# Patient Record
Sex: FEMALE | Race: WHITE | NOT HISPANIC OR LATINO | Employment: PART TIME | URBAN - METROPOLITAN AREA
[De-identification: names, ages, dates, MRNs, and addresses within clinical notes are randomized per-mention and may not be internally consistent; named-entity substitution may affect disease eponyms.]

---

## 2017-07-13 ENCOUNTER — TRANSCRIBE ORDERS (OUTPATIENT)
Dept: ADMINISTRATIVE | Facility: HOSPITAL | Age: 62
End: 2017-07-13

## 2017-07-13 DIAGNOSIS — Z12.31 VISIT FOR SCREENING MAMMOGRAM: Primary | ICD-10-CM

## 2017-08-29 ENCOUNTER — HOSPITAL ENCOUNTER (OUTPATIENT)
Dept: MAMMOGRAPHY | Facility: MEDICAL CENTER | Age: 62
Discharge: HOME | End: 2017-08-29
Payer: COMMERCIAL

## 2017-08-29 DIAGNOSIS — Z12.31 VISIT FOR SCREENING MAMMOGRAM: ICD-10-CM

## 2017-08-29 PROCEDURE — G0202 SCR MAMMO BI INCL CAD: HCPCS

## 2017-08-29 PROCEDURE — 77063 BREAST TOMOSYNTHESIS BI: CPT

## 2018-05-10 RX ORDER — METOPROLOL SUCCINATE 25 MG
37.5 TABLET, EXTENDED RELEASE 24 HR ORAL
Refills: 3 | COMMUNITY
Start: 2018-04-03

## 2018-05-15 ENCOUNTER — OFFICE VISIT (OUTPATIENT)
Dept: UROLOGY | Facility: MEDICAL CENTER | Age: 63
End: 2018-05-15
Payer: COMMERCIAL

## 2018-05-15 VITALS
BODY MASS INDEX: 23.21 KG/M2 | DIASTOLIC BLOOD PRESSURE: 80 MMHG | SYSTOLIC BLOOD PRESSURE: 120 MMHG | HEIGHT: 63 IN | WEIGHT: 131 LBS

## 2018-05-15 DIAGNOSIS — N39.0 URINARY TRACT INFECTION WITHOUT HEMATURIA, SITE UNSPECIFIED: Primary | ICD-10-CM

## 2018-05-15 DIAGNOSIS — N30.20 CHRONIC CYSTITIS: ICD-10-CM

## 2018-05-15 LAB
SL AMB  POCT GLUCOSE, UA: ABNORMAL
SL AMB LEUKOCYTE ESTERASE,UA: ABNORMAL
SL AMB POCT BILIRUBIN,UA: ABNORMAL
SL AMB POCT BLOOD,UA: ABNORMAL
SL AMB POCT CLARITY,UA: CLEAR
SL AMB POCT COLOR,UA: YELLOW
SL AMB POCT KETONES,UA: ABNORMAL
SL AMB POCT NITRITE,UA: ABNORMAL
SL AMB POCT PH,UA: 5.5
SL AMB POCT SPECIFIC GRAVITY,UA: 1.01
SL AMB POCT URINE PROTEIN: ABNORMAL
SL AMB POCT UROBILINOGEN: 0.2

## 2018-05-15 PROCEDURE — 99213 OFFICE O/P EST LOW 20 MIN: CPT | Performed by: UROLOGY

## 2018-05-15 PROCEDURE — 81003 URINALYSIS AUTO W/O SCOPE: CPT | Performed by: UROLOGY

## 2018-05-15 RX ORDER — FLUTICASONE PROPIONATE 50 MCG
2 SPRAY, SUSPENSION (ML) NASAL AS NEEDED
COMMUNITY
Start: 2017-12-05 | End: 2020-08-25

## 2018-05-15 RX ORDER — OMEPRAZOLE 10 MG/1
10 CAPSULE, DELAYED RELEASE ORAL AS NEEDED
COMMUNITY

## 2018-05-15 RX ORDER — TRIMETHOPRIM 100 MG/1
100 TABLET ORAL 2 TIMES DAILY
Qty: 60 TABLET | Refills: 3 | Status: SHIPPED | OUTPATIENT
Start: 2018-05-15 | End: 2018-07-14

## 2018-05-15 NOTE — PROGRESS NOTES
[address] [facility]  [facility]  [address] [facility] [address]  [place], [address] [facility]  [phone]  www  [facility]  org      NAME: Ronnie Garcia  AGE: 63 y o  SEX: female  : 1955   MRN: [id number]    DATE: 5/15/2018  TIME: 1:28 PM    Assessment and Plan:  Continue with TMP postcoitally  Will send in rx  See me in 2 years or p r n  Maddie Lightning Chief Complaint   No chief complaint on file  History of Present Illness   F/U of chronic cystitis- has been followed previously by Florian Primrose  Last urinary tract infection was over a year ago  Symptoms of an infection are typically urgency and pressure in the bladder , and she occasionally passes blood  Most of these are post-coital She takes TMP after intercourse   PSH: Cystoscopy -         [address] Notes: wisdom Teeth Removal -    Neuroma removed from foot -         NON- PSH: Aspirate/inj Ganglion Cyst, Right -   Tonsillectomy          PMH: Urinary tract infection, site not specified (Urinary Tract Infection) - 8/15/2016  Urinary Tract Infection - 2016, - 2015, - , - , - , - , - , -   Urethritis - , -   Frequency of Urination  Overactive Bladder  Urgency Of Urination  Urinary Retention       NON- PMH: Asymptomatic Varicose Veins  GERD  Hypertension  Mitral Valve Prolapse  Tmj Disorder Unspec       Immunizations: None     FAMILY HISTORY: Diabetes - Mother  Hypertension - Father  Kidney Stones - Daughter  Lung Cancer - Father     SOCIAL HISTORY: Marital Status:   Patient does not smoke anymore  Has not smoked since 1979  Smoked for 2 years  Does not use smokeless tobacco   Drinks 1 drink per month  Does not use drugs  Does not drink caffeine  Has not had a blood transfusion  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         The following portions of the patient's history were reviewed and updated as appropriate: allergies, current medications, past family history, past medical history, past social history, past surgical history and problem list     Review of Systems   Review of Systems   Genitourinary: Negative  Active Problem List   There is no problem list on file for this patient  Objective   There were no vitals taken for this visit  Physical Exam   Constitutional: She is oriented to person, place, and time  She appears well-developed and well-nourished  HENT:   Head: Normocephalic and atraumatic  Eyes: EOM are normal    Neck: Normal range of motion  Pulmonary/Chest: Effort normal    Musculoskeletal: Normal range of motion  Neurological: She is alert and oriented to person, place, and time  Skin: Skin is warm and dry  Psychiatric: She has a normal mood and affect  Her behavior is normal  Judgment and thought content normal            Current Medications     Current Outpatient Prescriptions:     TOPROL XL 25 MG 24 hr tablet, TAKE 0 5 TABLETS (12 5 MG TOTAL) BY MOUTH DAILY  , Disp: , Rfl: 3        Clemon Canavan, MD

## 2018-05-15 NOTE — LETTER
May 15, 2018     Jonathan Salazar, DO  [address] [facility] [address]    Patient: Alicia Bonilla   YOB: 1955   Date of Visit: 5/15/2018       Dear Dr Alistair Thomas: Thank you for referring Alicia Bonilla to me for evaluation  Below are my notes for this consultation  If you have questions, please do not hesitate to call me  I look forward to following your patient along with you  Sincerely,        Frankie Azar MD        CC: No Recipients  Frankie Azar MD  5/15/2018  1:46 PM  Sign at close encounter  [address] [facility]  [address], [address] [place] [id number]  www  [facility]  org      NAME: Alicia Bonilla  AGE: 63 y o  SEX: female  : 1955   MRN: [id number]    DATE: 5/15/2018  TIME: 1:28 PM    Assessment and Plan:  Continue with TMP postcoitally  Will send in rx  See me in 2 years or p r n  Ivett Vilchis Chief Complaint   No chief complaint on file  History of Present Illness   F/U of chronic cystitis- has been followed previously by Ken Angel  Last urinary tract infection was over a year ago  Symptoms of an infection are typically urgency and pressure in the bladder , and she occasionally passes blood  Most of these are post-coital She takes TMP after intercourse       PSH: Cystoscopy -         [address] [facility] [place] Notes: wisdom Teeth Removal -    Neuroma removed from foot -         NON- PSH: Aspirate/inj Ganglion Cyst, Right -   Tonsillectomy          PMH: Urinary tract infection, site not specified (Urinary Tract Infection) - 8/15/2016  Urinary Tract Infection - 2016, - 2015, - , - , - , - , - , -   Urethritis - , -   Frequency of Urination  Overactive Bladder  Urgency Of Urination  Urinary Retention       NON- PMH: Asymptomatic Varicose Veins  GERD  Hypertension  Mitral Valve Prolapse  Tmj Disorder Unspec Immunizations: None     FAMILY HISTORY: Diabetes - Mother  Hypertension - Father  Kidney Stones - Daughter  Lung Cancer - Father     SOCIAL HISTORY: Marital Status:   Patient does not smoke anymore  Has not smoked since 8/12/1979  Smoked for 2 years  Does not use smokeless tobacco   Drinks 1 drink per month  Does not use drugs  Does not drink caffeine  Has not had a blood transfusion  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  The following portions of the patient's history were reviewed and updated as appropriate: allergies, current medications, past family history, past medical history, past social history, past surgical history and problem list     Review of Systems   Review of Systems   Genitourinary: Negative  Active Problem List   There is no problem list on file for this patient  Objective   There were no vitals taken for this visit  Physical Exam   Constitutional: She is oriented to person, place, and time  She appears well-developed and well-nourished  HENT:   Head: Normocephalic and atraumatic  Eyes: EOM are normal    Neck: Normal range of motion  Pulmonary/Chest: Effort normal    Musculoskeletal: Normal range of motion  Neurological: She is alert and oriented to person, place, and time  Skin: Skin is warm and dry  Psychiatric: She has a normal mood and affect  Her behavior is normal  Judgment and thought content normal            Current Medications     Current Outpatient Prescriptions:     TOPROL XL 25 MG 24 hr tablet, TAKE 0 5 TABLETS (12 5 MG TOTAL) BY MOUTH DAILY  , Disp: , Rfl: 3        Kory Dudley MD

## 2018-08-14 ENCOUNTER — TRANSCRIBE ORDERS (OUTPATIENT)
Dept: ADMINISTRATIVE | Facility: HOSPITAL | Age: 63
End: 2018-08-14

## 2018-08-14 DIAGNOSIS — Z12.39 BREAST SCREENING: Primary | ICD-10-CM

## 2018-09-28 ENCOUNTER — HOSPITAL ENCOUNTER (OUTPATIENT)
Dept: MAMMOGRAPHY | Facility: MEDICAL CENTER | Age: 63
Discharge: HOME | End: 2018-09-28
Payer: COMMERCIAL

## 2018-09-28 DIAGNOSIS — Z12.39 BREAST SCREENING: ICD-10-CM

## 2018-09-28 PROCEDURE — 77063 BREAST TOMOSYNTHESIS BI: CPT

## 2018-09-28 PROCEDURE — 77067 SCR MAMMO BI INCL CAD: CPT

## 2018-11-12 ENCOUNTER — TELEPHONE (OUTPATIENT)
Dept: UROLOGY | Facility: AMBULATORY SURGERY CENTER | Age: 63
End: 2018-11-12

## 2018-11-12 ENCOUNTER — TELEPHONE (OUTPATIENT)
Dept: UROLOGY | Facility: MEDICAL CENTER | Age: 63
End: 2018-11-12

## 2018-11-12 ENCOUNTER — OFFICE VISIT (OUTPATIENT)
Dept: UROLOGY | Facility: MEDICAL CENTER | Age: 63
End: 2018-11-12
Payer: COMMERCIAL

## 2018-11-12 VITALS
HEIGHT: 63 IN | WEIGHT: 133 LBS | BODY MASS INDEX: 23.57 KG/M2 | DIASTOLIC BLOOD PRESSURE: 80 MMHG | SYSTOLIC BLOOD PRESSURE: 130 MMHG

## 2018-11-12 DIAGNOSIS — N39.0 URINARY TRACT INFECTION WITHOUT HEMATURIA, SITE UNSPECIFIED: Primary | ICD-10-CM

## 2018-11-12 LAB
SL AMB  POCT GLUCOSE, UA: ABNORMAL
SL AMB LEUKOCYTE ESTERASE,UA: ABNORMAL
SL AMB POCT BILIRUBIN,UA: ABNORMAL
SL AMB POCT BLOOD,UA: ABNORMAL
SL AMB POCT CLARITY,UA: CLEAR
SL AMB POCT COLOR,UA: ABNORMAL
SL AMB POCT KETONES,UA: ABNORMAL
SL AMB POCT NITRITE,UA: ABNORMAL
SL AMB POCT PH,UA: 5
SL AMB POCT SPECIFIC GRAVITY,UA: 1
SL AMB POCT URINE PROTEIN: ABNORMAL
SL AMB POCT UROBILINOGEN: 0.2

## 2018-11-12 PROCEDURE — 87186 SC STD MICRODIL/AGAR DIL: CPT | Performed by: UROLOGY

## 2018-11-12 PROCEDURE — 87077 CULTURE AEROBIC IDENTIFY: CPT | Performed by: UROLOGY

## 2018-11-12 PROCEDURE — 81003 URINALYSIS AUTO W/O SCOPE: CPT | Performed by: UROLOGY

## 2018-11-12 PROCEDURE — 99213 OFFICE O/P EST LOW 20 MIN: CPT | Performed by: UROLOGY

## 2018-11-12 PROCEDURE — 87086 URINE CULTURE/COLONY COUNT: CPT | Performed by: UROLOGY

## 2018-11-12 PROCEDURE — 87147 CULTURE TYPE IMMUNOLOGIC: CPT | Performed by: UROLOGY

## 2018-11-12 RX ORDER — CEPHALEXIN 500 MG/1
500 CAPSULE ORAL EVERY 6 HOURS SCHEDULED
Qty: 28 CAPSULE | Refills: 0 | Status: SHIPPED | OUTPATIENT
Start: 2018-11-12 | End: 2018-11-19

## 2018-11-12 NOTE — TELEPHONE ENCOUNTER
Pt having painful urination started 11/10/18 has hx uti asking if she can get urine checked before starting antibiotics she took 1 Trimethoprim 100 mg am /pm 11/11/18,shw was also diagnosed with herpes simplex and finished course Valacyclovir 11/11/18

## 2018-11-12 NOTE — LETTER
2018     Mandy Camera, DO  [address] [facility] [address]    Patient: Cameron Gupta   YOB: 1955   Date of Visit: 2018       Dear Dr Padmini Beard: Thank you for referring Cameron Gupta to me for evaluation  Below are my notes for this consultation  If you have questions, please do not hesitate to call me  I look forward to following your patient along with you  Sincerely,        Nati Mares MD        CC: No Recipients  Nati Mares MD  2018  2:08 PM  Sign at close encounter  [address] [facility]  [place] [phone]  www  [facility]  org      NAME: Cameron Gupta  AGE: 66 y o  SEX: female  : 1955   MRN: [id number]    DATE: 2018  TIME: 1:24 PM    Assessment and Plan:  Acute on chronic cystitis  It seems to be responding to the trimethoprim, but since she takes that long-term and she is still feeling a little rough, we will start on Keflex 500 mg p o  QID for 7 days  Follow-up as planned or sooner as needed  Chief Complaint   No chief complaint on file  History of Present Illness   History of chronic cystitis  She takes trimethoprim after intercourse  She started having painful urination on -is concerned that she might have an infection once again her urine checked  He was also 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  and was on valacyclovir, which he finished on Saturday evening  She started having signs of urinary tract infection on Saturday into 1 trimethoprim and started to feel better  The next day she developed cramping down her pelvic region and burning after urination in the groin area-the vaginal area felt very inflamed and swollen  She took 2 doses yesterday  She feels better in terms of urinary tract now  Urinalysis shows trace blood and trace leukocyte esterase  This was sent for culture  The following portions of the patient's history were reviewed and updated as appropriate: allergies, current medications, past family history, past medical history, past social history, past surgical history and problem list     Review of Systems   Review of Systems   Genitourinary: Positive for dysuria  Negative for difficulty urinating  Active Problem List   There is no problem list on file for this patient  Objective   There were no vitals taken for this visit  Physical Exam   Constitutional: She is oriented to person, place, and time  She appears well-developed and well-nourished  HENT:   Head: Normocephalic and atraumatic  Eyes: EOM are normal    Neck: Normal range of motion  Pulmonary/Chest: Effort normal    Musculoskeletal: Normal range of motion  Neurological: She is alert and oriented to person, place, and time  Skin: Skin is warm and dry  Psychiatric: She has a normal mood and affect  Her behavior is normal  Judgment and thought content normal            Current Medications     Current Outpatient Prescriptions:     ACIDOPHILUS LACTOBACILLUS PO, Take 1 tablet by mouth, Disp: , Rfl:     fluticasone (FLONASE) 50 mcg/act nasal spray, 2 sprays into each nostril, Disp: , Rfl:     Multiple Vitamins-Minerals (MULTIVITAMIN ADULT PO), Take 1 capsule by mouth, Disp: , Rfl:     Omega-3 Fatty Acids (FISH OIL PO), Take 1 g by mouth, Disp: , Rfl:     omeprazole (PriLOSEC) 10 mg delayed release capsule, Take 10 mg by mouth, Disp: , Rfl:     TOPROL XL 25 MG 24 hr tablet, TAKE 0 5 TABLETS (12 5 MG TOTAL) BY MOUTH DAILY  , Disp: , Rfl: 3        Faustino Wayne MD

## 2018-11-12 NOTE — TELEPHONE ENCOUNTER
Pt has taken 2 doses of Trimethoprim, has appt with PB today  Advised not to take any more, he will advise on further treatment when she sees him today  She agreed

## 2018-11-12 NOTE — TELEPHONE ENCOUNTER
Pt scheduled 2 pm w Dr Gloria Barclay 11/12, wanted to come in for appt; also wants to know if taking Trimethoprim is going to alter UC done today, please advise and call back    [phone]

## 2018-11-12 NOTE — PROGRESS NOTES
[address] [facility]  [facility]  [address] [facility] [place]  [place], [address] [place] [address]  [phone]  www  [facility]  org      NAME: Monique Bosch  AGE: 66 y o  SEX: female  : 1955   MRN: [id number]    DATE: 2018  TIME: 1:24 PM    Assessment and Plan:  Acute on chronic cystitis  It seems to be responding to the trimethoprim, but since she takes that long-term and she is still feeling a little rough, we will start on Keflex 500 mg p o  QID for 7 days  Follow-up as planned or sooner as needed  Chief Complaint   No chief complaint on file  History of Present Illness   History of chronic cystitis  She takes trimethoprim after intercourse  She started having painful urination on -is concerned that she might have an infection once again her urine checked  He was also 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  and was on valacyclovir, which he finished on Saturday evening  She started having signs of urinary tract infection on Saturday into 1 trimethoprim and started to feel better  The next day she developed cramping down her pelvic region and burning after urination in the groin area-the vaginal area felt very inflamed and swollen  She took 2 doses yesterday  She feels better in terms of urinary tract now  Urinalysis shows trace blood and trace leukocyte esterase  This was sent for culture  The following portions of the patient's history were reviewed and updated as appropriate: allergies, current medications, past family history, past medical history, past social history, past surgical history and problem list     Review of Systems   Review of Systems   Genitourinary: Positive for dysuria  Negative for difficulty urinating  Active Problem List   There is no problem list on file for this patient  Objective   There were no vitals taken for this visit      Physical Exam   Constitutional: She is oriented to person, place, and time  She appears well-developed and well-nourished  HENT:   Head: Normocephalic and atraumatic  Eyes: EOM are normal    Neck: Normal range of motion  Pulmonary/Chest: Effort normal    Musculoskeletal: Normal range of motion  Neurological: She is alert and oriented to person, place, and time  Skin: Skin is warm and dry  Psychiatric: She has a normal mood and affect  Her behavior is normal  Judgment and thought content normal            Current Medications     Current Outpatient Prescriptions:     ACIDOPHILUS LACTOBACILLUS PO, Take 1 tablet by mouth, Disp: , Rfl:     fluticasone (FLONASE) 50 mcg/act nasal spray, 2 sprays into each nostril, Disp: , Rfl:     Multiple Vitamins-Minerals (MULTIVITAMIN ADULT PO), Take 1 capsule by mouth, Disp: , Rfl:     Omega-3 Fatty Acids (FISH OIL PO), Take 1 g by mouth, Disp: , Rfl:     omeprazole (PriLOSEC) 10 mg delayed release capsule, Take 10 mg by mouth, Disp: , Rfl:     TOPROL XL 25 MG 24 hr tablet, TAKE 0 5 TABLETS (12 5 MG TOTAL) BY MOUTH DAILY  , Disp: , Rfl: 3        Khadar Larios MD

## 2018-11-12 NOTE — TELEPHONE ENCOUNTER
Patient called wanted to speak with the nurse to make sure the medication dosage was correct   She can be reached at [phone]

## 2018-11-12 NOTE — TELEPHONE ENCOUNTER
I spoke with the prescribing doctor, then with the patient; The dose prescribed by Dr Dajuan Loyola was in fact the intended dose  She has no further questions or concerns to be addressed at this time

## 2018-11-14 ENCOUNTER — TELEPHONE (OUTPATIENT)
Dept: UROLOGY | Facility: MEDICAL CENTER | Age: 63
End: 2018-11-14

## 2018-11-14 LAB
BACTERIA UR CULT: ABNORMAL
BACTERIA UR CULT: ABNORMAL

## 2018-11-14 NOTE — TELEPHONE ENCOUNTER
----- Message from Alesha Zeng MD sent at 11/14/2018  2:39 PM EST -----  She is already on Keflex  Please let patient know that she has an infection and she is on the appropriate antibiotic

## 2018-11-20 DIAGNOSIS — N30.20 CHRONIC CYSTITIS: Primary | ICD-10-CM

## 2018-11-20 NOTE — TELEPHONE ENCOUNTER
Patient c/o of burning lower abdomen almost every time she urinates  No fever,no blood,no nausea  She does feel like she empties her bladder also  She said her lower abdomen feels inflamed and her lower back just aches  She is not constipated  This all started while she was on the antibiotic  She was instructed to increase her fluids  Any orders

## 2018-11-20 NOTE — TELEPHONE ENCOUNTER
Called patient and scheduled her for tomorrow 11/21/18 at [facility]

## 2018-11-20 NOTE — TELEPHONE ENCOUNTER
Patient finished taking Keflex, but she is feeling residual burning after she urinates  She feels as if she's still inflamed  Please call her at [phone]

## 2018-11-20 NOTE — PROGRESS NOTES
Despite Keflex which she has the appropriate antibiotic  She still has pain with voiding, lower back pain  We will schedule renal ultrasound  She can also take Pyridium

## 2018-11-21 ENCOUNTER — HOSPITAL ENCOUNTER (OUTPATIENT)
Dept: ULTRASOUND IMAGING | Facility: MEDICAL CENTER | Age: 63
Discharge: HOME | End: 2018-11-21
Attending: UROLOGY
Payer: COMMERCIAL

## 2018-11-21 DIAGNOSIS — N30.20 CHRONIC CYSTITIS: ICD-10-CM

## 2018-11-21 PROCEDURE — 76770 US EXAM ABDO BACK WALL COMP: CPT

## 2019-02-21 ENCOUNTER — TELEPHONE (OUTPATIENT)
Dept: UROLOGY | Facility: AMBULATORY SURGERY CENTER | Age: 64
End: 2019-02-21

## 2019-02-21 DIAGNOSIS — N30.90 CYSTITIS: Primary | ICD-10-CM

## 2019-02-21 NOTE — TELEPHONE ENCOUNTER
Patient called thinks she has UTI and would like to have urinalysis done   She can be reached at [phone]

## 2019-02-21 NOTE — TELEPHONE ENCOUNTER
Left detailed message on  that urine culture order is in her  chart  She can give a sample at any  lab  Call back if any questions

## 2019-02-22 ENCOUNTER — APPOINTMENT (OUTPATIENT)
Dept: LAB | Facility: MEDICAL CENTER | Age: 64
End: 2019-02-22
Attending: UROLOGY
Payer: COMMERCIAL

## 2019-02-22 DIAGNOSIS — N30.90 CYSTITIS: ICD-10-CM

## 2019-02-22 PROCEDURE — 87086 URINE CULTURE/COLONY COUNT: CPT

## 2019-02-23 LAB — BACTERIA UR CULT: NORMAL

## 2019-02-25 ENCOUNTER — TELEPHONE (OUTPATIENT)
Dept: UROLOGY | Facility: MEDICAL CENTER | Age: 64
End: 2019-02-25

## 2019-02-25 NOTE — TELEPHONE ENCOUNTER
Patient is aware of culture  Patient was using Trimethoprim, culture is negative and can stop taking it  Patient asked if she should see her gyn for her pelvic pain vaginal dryness she has, suggested  they may help with her but it would be up to her

## 2019-04-05 ENCOUNTER — OFFICE VISIT (OUTPATIENT)
Dept: URGENT CARE | Facility: MEDICAL CENTER | Age: 64
End: 2019-04-05
Payer: COMMERCIAL

## 2019-04-05 VITALS
OXYGEN SATURATION: 99 % | WEIGHT: 133 LBS | BODY MASS INDEX: 23.57 KG/M2 | HEART RATE: 95 BPM | SYSTOLIC BLOOD PRESSURE: 200 MMHG | DIASTOLIC BLOOD PRESSURE: 80 MMHG | RESPIRATION RATE: 16 BRPM | TEMPERATURE: 96.6 F | HEIGHT: 63 IN

## 2019-04-05 DIAGNOSIS — R30.0 DYSURIA: ICD-10-CM

## 2019-04-05 DIAGNOSIS — N39.0 URINARY TRACT INFECTION WITH HEMATURIA, SITE UNSPECIFIED: Primary | ICD-10-CM

## 2019-04-05 DIAGNOSIS — R31.9 URINARY TRACT INFECTION WITH HEMATURIA, SITE UNSPECIFIED: Primary | ICD-10-CM

## 2019-04-05 LAB
SL AMB  POCT GLUCOSE, UA: NEGATIVE
SL AMB LEUKOCYTE ESTERASE,UA: ABNORMAL
SL AMB POCT BILIRUBIN,UA: NEGATIVE
SL AMB POCT BLOOD,UA: ABNORMAL
SL AMB POCT CLARITY,UA: CLEAR
SL AMB POCT COLOR,UA: ABNORMAL
SL AMB POCT KETONES,UA: NEGATIVE
SL AMB POCT NITRITE,UA: NEGATIVE
SL AMB POCT PH,UA: 6
SL AMB POCT SPECIFIC GRAVITY,UA: 1
SL AMB POCT URINE PROTEIN: NEGATIVE
SL AMB POCT UROBILINOGEN: 0.2

## 2019-04-05 PROCEDURE — 87077 CULTURE AEROBIC IDENTIFY: CPT | Performed by: FAMILY MEDICINE

## 2019-04-05 PROCEDURE — 87086 URINE CULTURE/COLONY COUNT: CPT | Performed by: FAMILY MEDICINE

## 2019-04-05 PROCEDURE — 99214 OFFICE O/P EST MOD 30 MIN: CPT | Performed by: FAMILY MEDICINE

## 2019-04-05 PROCEDURE — 87186 SC STD MICRODIL/AGAR DIL: CPT | Performed by: FAMILY MEDICINE

## 2019-04-05 RX ORDER — PHENAZOPYRIDINE HYDROCHLORIDE 100 MG/1
100 TABLET, FILM COATED ORAL 3 TIMES DAILY PRN
Qty: 10 TABLET | Refills: 0 | Status: SHIPPED | OUTPATIENT
Start: 2019-04-05 | End: 2019-08-07 | Stop reason: SDUPTHER

## 2019-04-05 RX ORDER — CEPHALEXIN 500 MG/1
500 CAPSULE ORAL EVERY 6 HOURS SCHEDULED
Qty: 28 CAPSULE | Refills: 0 | Status: SHIPPED | OUTPATIENT
Start: 2019-04-05 | End: 2019-04-12

## 2019-04-08 LAB — BACTERIA UR CULT: ABNORMAL

## 2019-04-09 ENCOUNTER — TELEPHONE (OUTPATIENT)
Dept: UROLOGY | Facility: MEDICAL CENTER | Age: 64
End: 2019-04-09

## 2019-04-09 DIAGNOSIS — R30.0 DYSURIA: Primary | ICD-10-CM

## 2019-04-15 ENCOUNTER — LAB (OUTPATIENT)
Dept: LAB | Facility: MEDICAL CENTER | Age: 64
End: 2019-04-15
Attending: UROLOGY
Payer: COMMERCIAL

## 2019-04-15 DIAGNOSIS — R30.0 DYSURIA: ICD-10-CM

## 2019-04-15 PROCEDURE — 87086 URINE CULTURE/COLONY COUNT: CPT

## 2019-04-16 LAB — BACTERIA UR CULT: NORMAL

## 2019-05-02 ENCOUNTER — TRANSCRIBE ORDERS (OUTPATIENT)
Dept: ADMINISTRATIVE | Facility: HOSPITAL | Age: 64
End: 2019-05-02

## 2019-05-02 DIAGNOSIS — Z12.39 BREAST SCREENING, UNSPECIFIED: Primary | ICD-10-CM

## 2019-08-05 ENCOUNTER — TELEPHONE (OUTPATIENT)
Dept: UROLOGY | Facility: MEDICAL CENTER | Age: 64
End: 2019-08-05

## 2019-08-05 ENCOUNTER — APPOINTMENT (OUTPATIENT)
Dept: LAB | Facility: MEDICAL CENTER | Age: 64
End: 2019-08-05
Attending: UROLOGY
Payer: COMMERCIAL

## 2019-08-05 ENCOUNTER — TELEPHONE (OUTPATIENT)
Dept: UROLOGY | Facility: AMBULATORY SURGERY CENTER | Age: 64
End: 2019-08-05

## 2019-08-05 DIAGNOSIS — N30.90 CYSTITIS: Primary | ICD-10-CM

## 2019-08-05 DIAGNOSIS — R30.0 DYSURIA: Primary | ICD-10-CM

## 2019-08-05 DIAGNOSIS — N30.20 CHRONIC CYSTITIS: Primary | ICD-10-CM

## 2019-08-05 DIAGNOSIS — N30.20 CHRONIC CYSTITIS: ICD-10-CM

## 2019-08-05 LAB
BACTERIA UR QL AUTO: ABNORMAL /HPF
BILIRUB UR QL STRIP: NEGATIVE
CLARITY UR: ABNORMAL
COLOR UR: YELLOW
GLUCOSE UR STRIP-MCNC: NEGATIVE MG/DL
HGB UR QL STRIP.AUTO: ABNORMAL
KETONES UR STRIP-MCNC: NEGATIVE MG/DL
LEUKOCYTE ESTERASE UR QL STRIP: ABNORMAL
NITRITE UR QL STRIP: NEGATIVE
NON-SQ EPI CELLS URNS QL MICRO: ABNORMAL /HPF
PH UR STRIP.AUTO: 6.5 [PH]
PROT UR STRIP-MCNC: ABNORMAL MG/DL
RBC #/AREA URNS AUTO: ABNORMAL /HPF
SP GR UR STRIP.AUTO: 1.01 (ref 1–1.03)
UROBILINOGEN UR QL STRIP.AUTO: 0.2 E.U./DL
WBC #/AREA URNS AUTO: ABNORMAL /HPF

## 2019-08-05 PROCEDURE — 81001 URINALYSIS AUTO W/SCOPE: CPT

## 2019-08-05 PROCEDURE — 87086 URINE CULTURE/COLONY COUNT: CPT

## 2019-08-05 RX ORDER — PHENAZOPYRIDINE HYDROCHLORIDE 200 MG/1
200 TABLET, FILM COATED ORAL 3 TIMES DAILY PRN
Qty: 30 TABLET | Refills: 1 | Status: SHIPPED | OUTPATIENT
Start: 2019-08-05 | End: 2019-08-07

## 2019-08-05 RX ORDER — OXYBUTYNIN CHLORIDE 5 MG/1
5 TABLET ORAL 3 TIMES DAILY PRN
Qty: 45 TABLET | Refills: 6 | Status: SHIPPED | OUTPATIENT
Start: 2019-08-05 | End: 2020-08-25 | Stop reason: ALTCHOICE

## 2019-08-05 NOTE — TELEPHONE ENCOUNTER
cary arzate while urinating and buring after   she would like to know if she should go have a culture done

## 2019-08-05 NOTE — TELEPHONE ENCOUNTER
Patient request ua order to be put in as well  Patient at lab now  Rigo Ray from office will put order in

## 2019-08-05 NOTE — TELEPHONE ENCOUNTER
Spoke to pt, c/o urgency, spasms, extreme pain and burning  Denies f/c, blood in urine  [facility]    Asking for rx to help with symptoms until results come back

## 2019-08-05 NOTE — TELEPHONE ENCOUNTER
Spoke with the patient; I've informed her that Dr Patricia Ellis has prescribed Pyridium 200mg for the burning sensation and Oxybutynin 5mg for her bladder spasms  She states that [facility] had called to tell her that her Pyridium 200mg wasn't covered by her insurance  However, she has a previous prescription for Pyridium 100mg that she got this past April  She'd like to know if she can use that instead  I've directed her that it's half the dose, so she'd need to take 2 tabs TID PRN  She understands  She'll wait for a call on her urine culture  She has no further questions or concerns to be addressed at this time

## 2019-08-05 NOTE — TELEPHONE ENCOUNTER
PT HAVING BURNING DURING URINATION AND FREQUENCY WITH SPASM  LOWER ABDOMINAL PAIN AND PRESSURE  NO BLOOD IN URINE   PLEASE CALL

## 2019-08-06 LAB — BACTERIA UR CULT: NORMAL

## 2019-08-07 ENCOUNTER — OFFICE VISIT (OUTPATIENT)
Dept: UROLOGY | Facility: MEDICAL CENTER | Age: 64
End: 2019-08-07
Payer: COMMERCIAL

## 2019-08-07 VITALS
BODY MASS INDEX: 23.74 KG/M2 | HEART RATE: 68 BPM | DIASTOLIC BLOOD PRESSURE: 102 MMHG | WEIGHT: 134 LBS | SYSTOLIC BLOOD PRESSURE: 168 MMHG | HEIGHT: 63 IN

## 2019-08-07 DIAGNOSIS — N30.20 CHRONIC CYSTITIS: ICD-10-CM

## 2019-08-07 DIAGNOSIS — N89.8 VAGINAL ITCHING: ICD-10-CM

## 2019-08-07 DIAGNOSIS — N39.0 URINARY TRACT INFECTION WITH HEMATURIA, SITE UNSPECIFIED: ICD-10-CM

## 2019-08-07 DIAGNOSIS — R30.0 DYSURIA: Primary | ICD-10-CM

## 2019-08-07 DIAGNOSIS — R31.9 URINARY TRACT INFECTION WITH HEMATURIA, SITE UNSPECIFIED: ICD-10-CM

## 2019-08-07 LAB
SL AMB  POCT GLUCOSE, UA: ABNORMAL
SL AMB LEUKOCYTE ESTERASE,UA: ABNORMAL
SL AMB POCT BILIRUBIN,UA: ABNORMAL
SL AMB POCT BLOOD,UA: ABNORMAL
SL AMB POCT CLARITY,UA: CLEAR
SL AMB POCT COLOR,UA: YELLOW
SL AMB POCT KETONES,UA: ABNORMAL
SL AMB POCT NITRITE,UA: POSITIVE
SL AMB POCT PH,UA: 5
SL AMB POCT SPECIFIC GRAVITY,UA: <=1.005
SL AMB POCT URINE PROTEIN: ABNORMAL
SL AMB POCT UROBILINOGEN: 0.2

## 2019-08-07 PROCEDURE — 81003 URINALYSIS AUTO W/O SCOPE: CPT | Performed by: UROLOGY

## 2019-08-07 PROCEDURE — 99214 OFFICE O/P EST MOD 30 MIN: CPT | Performed by: UROLOGY

## 2019-08-07 PROCEDURE — 87086 URINE CULTURE/COLONY COUNT: CPT | Performed by: UROLOGY

## 2019-08-07 RX ORDER — ESTRADIOL 0.1 MG/G
CREAM VAGINAL
Refills: 4 | COMMUNITY
Start: 2019-07-08 | End: 2020-08-25 | Stop reason: ALTCHOICE

## 2019-08-07 RX ORDER — FLUCONAZOLE 150 MG/1
150 TABLET ORAL ONCE
Qty: 1 TABLET | Refills: 0 | Status: SHIPPED | OUTPATIENT
Start: 2019-08-07 | End: 2019-08-07

## 2019-08-07 RX ORDER — PHENAZOPYRIDINE HYDROCHLORIDE 100 MG/1
100 TABLET, FILM COATED ORAL 3 TIMES DAILY PRN
Qty: 20 TABLET | Refills: 1 | Status: SHIPPED | OUTPATIENT
Start: 2019-08-07

## 2019-08-07 NOTE — PROGRESS NOTES
Assessment/Plan:      Diagnoses and all orders for this visit:    Dysuria  -     Urine culture; Future  -     Urine culture  -     Cancel: Cytology, urine; Future  -     phenazopyridine (PYRIDIUM) 100 mg tablet; Take 1 tablet (100 mg total) by mouth 3 (three) times a day as needed for bladder spasms    Chronic cystitis  -     Urine culture; Future  -     Urine culture  -     Cancel: Cytology, urine; Future  -     POCT urine dip auto non-scope    Vaginal itching  -     fluconazole (DIFLUCAN) 150 mg tablet; Take 1 tablet (150 mg total) by mouth once for 1 dose    Urinary tract infection with hematuria, site unspecified  -     phenazopyridine (PYRIDIUM) 100 mg tablet; Take 1 tablet (100 mg total) by mouth 3 (three) times a day as needed for bladder spasms    Other orders  -     estradiol (ESTRACE) 0 1 mg/g vaginal cream; INSERT 1 G INTO THE VAGINA 3 (THREE) TIMES A WEEK     Assessment and Plan:  Acute on chronic cystitis  She has been having more burning post urination and vaginal itching possibly consistent yeast infection  We will send a urine culture and will prescribe a Diflucan tablet  I recommended that she could also confer with her gynecologist about initiating estrogen cream             Chief Complaint   Bladder and inguinal type discomfort postvoid        History of Present Illness   History of chronic cystitis  She takes trimethoprim after intercourse  She started having painful post urination symptomatology about a week ago, and she was once again checked for urine infection earlier this week that was negative         The following portions of the patient's history were reviewed and updated as appropriate: allergies, current medications, past family history, past medical history, past social history, past surgical history and problem list      Review of Systems   Review of Systems   Genitourinary: Positive for postvoid bladder discomfort and vaginal inguinal itching   Negative for difficulty urinating or hematuria          Active Problem List   There is no problem list on file for this patient         Objective   There were no vitals taken for this visit      Physical Exam   Constitutional: She is oriented to person, place, and time  She appears well-developed and well-nourished  HENT:   Head: Normocephalic and atraumatic  Eyes: EOM are normal    Neck: Normal range of motion  Pulmonary/Chest: Effort normal    Musculoskeletal: Normal range of motion  Neurological: She is alert and oriented to person, place, and time  Skin: Skin is warm and dry  Psychiatric: She has a normal mood and affect  Her behavior is normal  Judgment and thought content normal       Current Medications      Current Outpatient Prescriptions:     ACIDOPHILUS LACTOBACILLUS PO, Take 1 tablet by mouth, Disp: , Rfl:     fluticasone (FLONASE) 50 mcg/act nasal spray, 2 sprays into each nostril, Disp: , Rfl:     Multiple Vitamins-Minerals (MULTIVITAMIN ADULT PO), Take 1 capsule by mouth, Disp: , Rfl:     Omega-3 Fatty Acids (FISH OIL PO), Take 1 g by mouth, Disp: , Rfl:     omeprazole (PriLOSEC) 10 mg delayed release capsule, Take 10 mg by mouth, Disp: , Rfl:     TOPROL XL 25 MG 24 hr tablet, TAKE 0 5 TABLETS (12 5 MG TOTAL) BY MOUTH DAILY  , Disp: , Rfl: 3    Renal ultrasound from 11/21/2018:   IMPRESSION:  Normal

## 2019-08-09 LAB — BACTERIA UR CULT: NORMAL

## 2019-09-04 RX ORDER — FLUCONAZOLE 150 MG/1
TABLET ORAL
COMMUNITY
Start: 2019-08-07 | End: 2020-08-25 | Stop reason: ALTCHOICE

## 2019-09-05 ENCOUNTER — OFFICE VISIT (OUTPATIENT)
Dept: UROLOGY | Facility: MEDICAL CENTER | Age: 64
End: 2019-09-05
Payer: COMMERCIAL

## 2019-09-05 VITALS
WEIGHT: 131 LBS | SYSTOLIC BLOOD PRESSURE: 142 MMHG | DIASTOLIC BLOOD PRESSURE: 80 MMHG | BODY MASS INDEX: 23.21 KG/M2 | HEIGHT: 63 IN | HEART RATE: 75 BPM

## 2019-09-05 DIAGNOSIS — R30.0 DYSURIA: Primary | ICD-10-CM

## 2019-09-05 DIAGNOSIS — N30.20 CHRONIC CYSTITIS: ICD-10-CM

## 2019-09-05 LAB
SL AMB  POCT GLUCOSE, UA: NEGATIVE
SL AMB LEUKOCYTE ESTERASE,UA: NEGATIVE
SL AMB POCT BILIRUBIN,UA: NEGATIVE
SL AMB POCT BLOOD,UA: NEGATIVE
SL AMB POCT CLARITY,UA: CLEAR
SL AMB POCT COLOR,UA: YELLOW
SL AMB POCT KETONES,UA: NEGATIVE
SL AMB POCT NITRITE,UA: NEGATIVE
SL AMB POCT PH,UA: 7
SL AMB POCT SPECIFIC GRAVITY,UA: <=1.005
SL AMB POCT URINE PROTEIN: NEGATIVE
SL AMB POCT UROBILINOGEN: 0.2

## 2019-09-05 PROCEDURE — 99214 OFFICE O/P EST MOD 30 MIN: CPT | Performed by: UROLOGY

## 2019-09-05 PROCEDURE — 81003 URINALYSIS AUTO W/O SCOPE: CPT | Performed by: UROLOGY

## 2019-09-05 RX ORDER — TRIMETHOPRIM 100 MG/1
100 TABLET ORAL 2 TIMES DAILY
Qty: 20 TABLET | Refills: 3 | Status: SHIPPED | OUTPATIENT
Start: 2019-09-05 | End: 2019-09-15

## 2019-09-05 NOTE — PROGRESS NOTES
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  Continue with dietary changes as she is doing, start Estrace cream and try Kefir  Also continue with D- mannose  Can use OTC preparations for the vulvar discomfort  Can continue with post coital trimethoprim  Chief Complaint   No chief complaint on file  History of Present Illness   History of chronic cystitis, in the past this taken trimethoprim after intercourse  She has had several episodes over the past year of yeast infections and symptoms of UTIs- most cxs negative, some only had small amount of bacteria  Seen  by Dr Rian Willson- urine cx was negative, and was given Diflucan 200 mg 1 tablet for yeast vaginitis  Renal ultrasound was normal 2018  Since we talked 1 month ago, she has researched IC and stopped dairy, gluten and wine and other acidic things  These dietary changes have definitely helped  She has not started Estrace yet  The following portions of the patient's history were reviewed and updated as appropriate: allergies, current medications, past family history, past medical history, past social history, past surgical history and problem list   Past Medical History:   Diagnosis Date    Frequency of urination     Hypertension     OAB (overactive bladder)     Urethritis     Urgency of urination     Urinary retention     UTI (urinary tract infection)      Past Surgical History:   Procedure Laterality Date    CYSTOSCOPY  2010    NEUROMA EXCISION      WISDOM TOOTH EXTRACTION       shoulder  Review of Systems   Review of Systems   Constitutional: Negative for unexpected weight change  Gastrointestinal: Negative  Genitourinary: Negative for difficulty urinating  Active Problem List   There is no problem list on file for this patient  Objective   /80   Pulse 75   Ht 5' 3" (1 6 m)   Wt 59 4 kg (131 lb)   BMI 23 21 kg/m²     Physical Exam   Constitutional: She is oriented to person, place, and time  She appears well-developed and well-nourished  HENT:   Head: Normocephalic and atraumatic  Eyes: EOM are normal    Neck: Normal range of motion  Pulmonary/Chest: Effort normal    Musculoskeletal: Normal range of motion  Neurological: She is alert and oriented to person, place, and time  Skin: Skin is warm and dry  Psychiatric: She has a normal mood and affect  Her behavior is normal  Judgment and thought content normal            Current Medications     Current Outpatient Medications:     ACIDOPHILUS LACTOBACILLUS PO, Take 1 tablet by mouth, Disp: , Rfl:     fluticasone (FLONASE) 50 mcg/act nasal spray, 2 sprays into each nostril as needed , Disp: , Rfl:     TOPROL XL 25 MG 24 hr tablet, TAKE 0 5 TABLETS (12 5 MG TOTAL) BY MOUTH DAILY  , Disp: , Rfl: 3    estradiol (ESTRACE) 0 1 mg/g vaginal cream, INSERT 1 G INTO THE VAGINA 3 (THREE) TIMES A WEEK , Disp: , Rfl: 4    fluconazole (DIFLUCAN) 150 mg tablet, , Disp: , Rfl:     Multiple Vitamins-Minerals (MULTIVITAMIN ADULT PO), Take 1 capsule by mouth, Disp: , Rfl:     Omega-3 Fatty Acids (FISH OIL PO), Take 1 g by mouth, Disp: , Rfl:     omeprazole (PriLOSEC) 10 mg delayed release capsule, Take 10 mg by mouth, Disp: , Rfl:     oxybutynin (DITROPAN) 5 mg tablet, Take 1 tablet (5 mg total) by mouth 3 (three) times a day as needed (Bladder spasms) (Patient not taking: Reported on 8/7/2019), Disp: 45 tablet, Rfl: 6    phenazopyridine (PYRIDIUM) 100 mg tablet, Take 1 tablet (100 mg total) by mouth 3 (three) times a day as needed for bladder spasms (Patient not taking: Reported on 9/5/2019), Disp: 20 tablet, Rfl: 1  Time- 25 minutes      Lisette Pace Mehnaz Kay MD

## 2019-10-18 ENCOUNTER — APPOINTMENT (OUTPATIENT)
Dept: LAB | Facility: MEDICAL CENTER | Age: 64
End: 2019-10-18
Payer: COMMERCIAL

## 2019-10-18 ENCOUNTER — TELEPHONE (OUTPATIENT)
Dept: UROLOGY | Facility: MEDICAL CENTER | Age: 64
End: 2019-10-18

## 2019-10-18 DIAGNOSIS — R39.9 UTI SYMPTOMS: Primary | ICD-10-CM

## 2019-10-18 DIAGNOSIS — R39.9 UTI SYMPTOMS: ICD-10-CM

## 2019-10-18 LAB
BACTERIA UR QL AUTO: ABNORMAL /HPF
BILIRUB UR QL STRIP: NEGATIVE
CLARITY UR: CLEAR
COLOR UR: YELLOW
GLUCOSE UR STRIP-MCNC: NEGATIVE MG/DL
HGB UR QL STRIP.AUTO: ABNORMAL
KETONES UR STRIP-MCNC: NEGATIVE MG/DL
LEUKOCYTE ESTERASE UR QL STRIP: ABNORMAL
NITRITE UR QL STRIP: POSITIVE
NON-SQ EPI CELLS URNS QL MICRO: ABNORMAL /HPF
PH UR STRIP.AUTO: 5 [PH]
PROT UR STRIP-MCNC: NEGATIVE MG/DL
RBC #/AREA URNS AUTO: ABNORMAL /HPF
SP GR UR STRIP.AUTO: 1 (ref 1–1.03)
UROBILINOGEN UR QL STRIP.AUTO: 0.2 E.U./DL
WBC #/AREA URNS AUTO: ABNORMAL /HPF

## 2019-10-18 PROCEDURE — 81001 URINALYSIS AUTO W/SCOPE: CPT

## 2019-10-18 NOTE — TELEPHONE ENCOUNTER
Patient returning call in regards to previous     Asking for an order to be sent today  [phone] or [phone]

## 2019-10-18 NOTE — TELEPHONE ENCOUNTER
Patient managed by Dr Phi Smith, seen in the [facility] office  Patient with history of recurrent UTIs  Returned call to patient  Patient reports burning and pressure, believes she has another UTI  Patient denies any fever  Reports she does have some Pyridium at home and took one earlier, with some relief in symptoms  Patient requesting to have order for UA and urine culture to check for infection  [facility] [address] [facility] this afternoon  Patient instructed to contact office if symptoms worsen, advise provider is always on call even on the weekend  Otherwise, office will call with results next week  Patient verbalized understanding, thankful for return call

## 2019-10-18 NOTE — TELEPHONE ENCOUNTER
Patient believes she has a uti  Patient is having pain and urgency and frequency  Patient requesting a ua  Please advise

## 2019-10-21 ENCOUNTER — APPOINTMENT (OUTPATIENT)
Dept: LAB | Facility: MEDICAL CENTER | Age: 64
End: 2019-10-21
Payer: COMMERCIAL

## 2019-10-21 ENCOUNTER — TELEPHONE (OUTPATIENT)
Dept: UROLOGY | Facility: AMBULATORY SURGERY CENTER | Age: 64
End: 2019-10-21

## 2019-10-21 ENCOUNTER — TRANSCRIBE ORDERS (OUTPATIENT)
Dept: ADMINISTRATIVE | Facility: HOSPITAL | Age: 64
End: 2019-10-21

## 2019-10-21 DIAGNOSIS — R39.9 UTI SYMPTOMS: ICD-10-CM

## 2019-10-21 DIAGNOSIS — R39.9 UTI SYMPTOMS: Primary | ICD-10-CM

## 2019-10-21 DIAGNOSIS — N39.0 URINARY TRACT INFECTION WITHOUT HEMATURIA, SITE UNSPECIFIED: Primary | ICD-10-CM

## 2019-10-21 PROCEDURE — 87086 URINE CULTURE/COLONY COUNT: CPT

## 2019-10-21 RX ORDER — CEPHALEXIN 500 MG/1
500 CAPSULE ORAL EVERY 12 HOURS SCHEDULED
Qty: 14 CAPSULE | Refills: 0 | Status: SHIPPED | OUTPATIENT
Start: 2019-10-21 | End: 2019-10-21

## 2019-10-21 RX ORDER — TRIMETHOPRIM 100 MG/1
100 TABLET ORAL 2 TIMES DAILY
Qty: 6 TABLET | Refills: 0 | Status: SHIPPED | OUTPATIENT
Start: 2019-10-21 | End: 2019-10-24

## 2019-10-21 NOTE — TELEPHONE ENCOUNTER
Prescription change the antibiotic of patient's choice  Yes urine testing should be performed 1 week after completion of antibiotic therapy    Orders placed in epic

## 2019-10-21 NOTE — TELEPHONE ENCOUNTER
It appears the urinalysis with microscopy has resulted but the urine culture states that it needs to be collected  Please call to have them run this in the lab  In the meantime, I will start the patient on antibiotic therapy so there is no delay in treatment

## 2019-10-21 NOTE — TELEPHONE ENCOUNTER
Pt received message she states Keflex upsets her stomach she has used Trimethoprim in the past which is sulfur free ,also questioning if she should do repeat specimen after completing antibiotic, she is on her way to lab to give another urine specimen as lab contacted her there was not enough from previous specimen to complete urine culture

## 2019-10-21 NOTE — TELEPHONE ENCOUNTER
Spoke to pt  Notified her Trimethoprim sent to pharmacy and pt is to repeat culture one week after completion  Pt did have culture done at [address]  Pt will call office by Thursday re: results

## 2019-10-21 NOTE — TELEPHONE ENCOUNTER
Spoke with lab who stated they did not run u/c  They are calling pt to resubmit urine sample  Called pt and l/m to tell pt about ABX and to start after submitting urine sample  Call with questions or concerns

## 2019-10-22 LAB — BACTERIA UR CULT: NORMAL

## 2019-10-23 NOTE — TELEPHONE ENCOUNTER
Patient called back to advise she received message and would like to know if she needs to stop antibiotic  Patient states since she started antibiotic she feels better and would like to continue if it is ok  Please advise

## 2019-10-23 NOTE — TELEPHONE ENCOUNTER
Patient calling to inquire about trimethoprim (PROLOPRIM) 100 mg tablet dose      She can be reached at [phone]

## 2019-10-23 NOTE — TELEPHONE ENCOUNTER
Pt checking status results if she does not answer returning call please leave message as she is  and not always available

## 2019-10-23 NOTE — TELEPHONE ENCOUNTER
Called patient - She will finish a 7 day course of the antibiotic  She states that she is feeling much better after starting this  U/A was positive for nitrates and leuks  Culture negative  She will go for a repeat culture next week

## 2019-11-06 ENCOUNTER — APPOINTMENT (OUTPATIENT)
Dept: LAB | Facility: MEDICAL CENTER | Age: 64
End: 2019-11-06
Payer: COMMERCIAL

## 2019-11-06 DIAGNOSIS — N39.0 URINARY TRACT INFECTION WITHOUT HEMATURIA, SITE UNSPECIFIED: ICD-10-CM

## 2019-11-06 LAB
BACTERIA UR QL AUTO: ABNORMAL /HPF
BILIRUB UR QL STRIP: NEGATIVE
CLARITY UR: ABNORMAL
COLOR UR: YELLOW
GLUCOSE UR STRIP-MCNC: NEGATIVE MG/DL
HGB UR QL STRIP.AUTO: ABNORMAL
HYALINE CASTS #/AREA URNS LPF: ABNORMAL /LPF
KETONES UR STRIP-MCNC: NEGATIVE MG/DL
LEUKOCYTE ESTERASE UR QL STRIP: ABNORMAL
NITRITE UR QL STRIP: NEGATIVE
NON-SQ EPI CELLS URNS QL MICRO: ABNORMAL /HPF
PH UR STRIP.AUTO: 6 [PH]
PROT UR STRIP-MCNC: NEGATIVE MG/DL
RBC #/AREA URNS AUTO: ABNORMAL /HPF
SP GR UR STRIP.AUTO: 1.01 (ref 1–1.03)
UROBILINOGEN UR QL STRIP.AUTO: 0.2 E.U./DL
WBC #/AREA URNS AUTO: ABNORMAL /HPF

## 2019-11-06 PROCEDURE — 81001 URINALYSIS AUTO W/SCOPE: CPT

## 2019-11-06 PROCEDURE — 87086 URINE CULTURE/COLONY COUNT: CPT

## 2019-11-07 LAB — BACTERIA UR CULT: NORMAL

## 2019-11-08 NOTE — TELEPHONE ENCOUNTER
Presence of leukocytes in UA with negative urine culture results can also be secondary to presence of bacterial involving vaginal alcides  This is benign  If patient asymptomatic, will monitor at this time

## 2019-11-08 NOTE — TELEPHONE ENCOUNTER
Pt is concerned about Leukocytes present in urine with negative culture results  Would like call re: this finding in her urine  Questioning if this could be indication of another problem other than UTI  Currently asymptomatic  Will direct to AP

## 2020-06-15 ENCOUNTER — HOSPITAL ENCOUNTER (OUTPATIENT)
Dept: MAMMOGRAPHY | Facility: MEDICAL CENTER | Age: 65
Discharge: HOME | End: 2020-06-15
Payer: COMMERCIAL

## 2020-06-15 VITALS — BODY MASS INDEX: 23.21 KG/M2 | HEIGHT: 63 IN | WEIGHT: 131 LBS

## 2020-06-15 DIAGNOSIS — Z12.31 ENCOUNTER FOR SCREENING MAMMOGRAM FOR MALIGNANT NEOPLASM OF BREAST: ICD-10-CM

## 2020-06-15 PROCEDURE — 77067 SCR MAMMO BI INCL CAD: CPT

## 2020-06-15 PROCEDURE — 77063 BREAST TOMOSYNTHESIS BI: CPT

## 2020-08-25 ENCOUNTER — OFFICE VISIT (OUTPATIENT)
Dept: UROLOGY | Facility: MEDICAL CENTER | Age: 65
End: 2020-08-25
Payer: COMMERCIAL

## 2020-08-25 VITALS
HEIGHT: 63 IN | DIASTOLIC BLOOD PRESSURE: 80 MMHG | WEIGHT: 123.8 LBS | SYSTOLIC BLOOD PRESSURE: 138 MMHG | BODY MASS INDEX: 21.93 KG/M2 | TEMPERATURE: 98 F

## 2020-08-25 DIAGNOSIS — K58.8 OTHER IRRITABLE BOWEL SYNDROME: ICD-10-CM

## 2020-08-25 DIAGNOSIS — N39.0 URINARY TRACT INFECTION WITHOUT HEMATURIA, SITE UNSPECIFIED: Primary | ICD-10-CM

## 2020-08-25 DIAGNOSIS — M62.89 PELVIC FLOOR DYSFUNCTION: ICD-10-CM

## 2020-08-25 LAB
SL AMB  POCT GLUCOSE, UA: NORMAL
SL AMB LEUKOCYTE ESTERASE,UA: NORMAL
SL AMB POCT BILIRUBIN,UA: NORMAL
SL AMB POCT BLOOD,UA: NORMAL
SL AMB POCT CLARITY,UA: CLEAR
SL AMB POCT COLOR,UA: YELLOW
SL AMB POCT KETONES,UA: NORMAL
SL AMB POCT NITRITE,UA: NORMAL
SL AMB POCT PH,UA: 5
SL AMB POCT SPECIFIC GRAVITY,UA: 1.01
SL AMB POCT URINE PROTEIN: NORMAL
SL AMB POCT UROBILINOGEN: 0.2

## 2020-08-25 PROCEDURE — 81003 URINALYSIS AUTO W/O SCOPE: CPT | Performed by: UROLOGY

## 2020-08-25 PROCEDURE — 99214 OFFICE O/P EST MOD 30 MIN: CPT | Performed by: UROLOGY

## 2020-08-25 RX ORDER — TRIMETHOPRIM 100 MG/1
TABLET ORAL
COMMUNITY
Start: 2020-06-03 | End: 2020-12-15 | Stop reason: SDUPTHER

## 2020-08-25 RX ORDER — DICYCLOMINE HYDROCHLORIDE 10 MG/1
10 CAPSULE ORAL EVERY 8 HOURS PRN
Qty: 30 CAPSULE | Refills: 5 | Status: SHIPPED | OUTPATIENT
Start: 2020-08-25

## 2020-08-25 NOTE — PROGRESS NOTES
[address] [facility]  [facility]  [address] [facility]  [place], [address] [facility]  [phone]  www  [facility]  org      NAME: Randy Conte  AGE: 60 y o  SEX: female  : 1955   MRN: [id number]    DATE: 2020  TIME: 11:40 AM    Assessment and Plan:     IBS:  I prescribed Bentyl to her pharmacy and we went over dietary factors  She can take 10 mg every 8 hours as needed  Interstitial cystitis: This is triggered by her IBS attacks, and basically is under good control with dietary modification  Postcoital urinary tract infections:  Continue with trimethoprim 1 pill  We will call in refills upon her request   Follow-up 1 year or p r n                Time- 25 minutes        Chief Complaint   No chief complaint on file  History of Present Illness   Follow-up chronic cystitis, 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  When I saw her last year I started her on Estrace cream and recommended Kefir  She was also on D mannose  She takes postcoital trimethoprim  Since  Changing her diet her IC has been a lot better  Urinalysis today shows trace blood  starting on , she had 3 bowel movements after eating pork , cheese in the face of lactose intolerance  And then proceeded to have what sounds like an attack of irritable bowel syndrome with abdominal distention, GERD, and frequent bowel movements  She then developed bladder pain with pain in the urethra and burning with urination and urinary frequency  She even had to use a heating pad to help relieve it  She took a Pyridium and 1 trimethoprim in today she is doing better  Today's urinalysis shows only trace blood no sign of infection    The following portions of the patient's history were reviewed and updated as appropriate: allergies, current medications, past family history, past medical history, past social history, past surgical history and problem list   Past Medical History:   Diagnosis Date    Frequency of urination     Hypertension     OAB (overactive bladder)     Urethritis     Urgency of urination     Urinary retention     UTI (urinary tract infection)      Past Surgical History:   Procedure Laterality Date    CYSTOSCOPY  2010    NEUROMA EXCISION      WISDOM TOOTH EXTRACTION       shoulder  Review of Systems   Review of Systems   Constitutional: Negative for fever  Respiratory: Negative  Cardiovascular: Negative  Gastrointestinal:        As per HPI   Genitourinary:        As per HPI       Active Problem List   There is no problem list on file for this patient  Objective   /80   Temp 98 °F (36 7 °C)   Ht 5' 3" (1 6 m)   Wt 56 2 kg (123 lb 12 8 oz)   BMI 21 93 kg/m²     Physical Exam  Constitutional:       Appearance: Normal appearance  HENT:      Head: Normocephalic and atraumatic  Eyes:      Extraocular Movements: Extraocular movements intact  Pulmonary:      Effort: Pulmonary effort is normal    Musculoskeletal: Normal range of motion  Neurological:      General: No focal deficit present  Mental Status: She is alert and oriented to person, place, and time  Mental status is at baseline  Psychiatric:         Mood and Affect: Mood normal          Behavior: Behavior normal          Thought Content:  Thought content normal          Judgment: Judgment normal              Current Medications     Current Outpatient Medications:     ACIDOPHILUS LACTOBACILLUS PO, Take 1 tablet by mouth, Disp: , Rfl:     fluticasone (FLONASE) 50 mcg/act nasal spray, 2 sprays into each nostril as needed , Disp: , Rfl:     Omega-3 Fatty Acids (FISH OIL PO), Take 1 g by mouth, Disp: , Rfl:     omeprazole (PriLOSEC) 10 mg delayed release capsule, Take 10 mg by mouth as needed , Disp: , Rfl:     phenazopyridine (PYRIDIUM) 100 mg tablet, Take 1 tablet (100 mg total) by mouth 3 (three) times a day as needed for bladder spasms, Disp: 20 tablet, Rfl: 1    TOPROL XL 25 MG 24 hr tablet, TAKE 0 5 TABLETS (12 5 MG TOTAL) BY MOUTH DAILY  , Disp: , Rfl: 3    trimethoprim (PROLOPRIM) 100 mg tablet, TAKE 1 TABLET (100 MG TOTAL) BY MOUTH 2 (TWO) TIMES A DAY FOR 20 DOSES TAKE AS DIRECTED, Disp: , Rfl:         Faustino Wayne MD

## 2020-09-21 ENCOUNTER — EVALUATION (OUTPATIENT)
Dept: PHYSICAL THERAPY | Facility: REHABILITATION | Age: 65
End: 2020-09-21
Payer: COMMERCIAL

## 2020-09-21 DIAGNOSIS — R10.2 PELVIC PAIN: Primary | ICD-10-CM

## 2020-09-21 DIAGNOSIS — M62.89 PELVIC FLOOR DYSFUNCTION: ICD-10-CM

## 2020-09-21 PROCEDURE — 97110 THERAPEUTIC EXERCISES: CPT | Performed by: PHYSICAL THERAPIST

## 2020-09-21 PROCEDURE — 97161 PT EVAL LOW COMPLEX 20 MIN: CPT | Performed by: PHYSICAL THERAPIST

## 2020-09-21 NOTE — PROGRESS NOTES
PT Evaluation     Today's date: 2020  Patient name: Sophie Tadeo  : 1955  MRN: [id number]  Referring provider: Carmen Tellez MD  Dx:   Encounter Diagnosis     ICD-10-CM    1  Pelvic pain  R10 2    2  Pelvic floor dysfunction  M62 89 Ambulatory referral to Physical Therapy       Start Time: 1510  Stop Time: 1610  Total time in clinic (min): 60 minutes    Assessment  Assessment details: Ms Alec Bello is a 59 yo female presenting with chronic pelvic pain, including IBS, painful bladder syndrome, and dyspareunia grade 2  Orthopedic assessment yields mild limitations in lumbar mobility without symptom provocation  Pelvic and abdominal muscle examination deferred due to recent flare in symptoms and patient preference  They will be assessed next visit; suspecting PF muscle overactivity as a contributor to symptoms of pelvic pain and treatment to be dictated by exam findings next visit  Shadia Alexandra is motivated and demonstrates good body awareness and low symptom irritability so prognosis for improved management of abdominopelvic symptoms is good  Impairments: abnormal or restricted ROM, activity intolerance, lacks appropriate home exercise program and pain with function    Symptom irritability: lowUnderstanding of Dx/Px/POC: good   Prognosis: good    Goals  STG  1  Urinary void interval 2-4 hours without increased pain within 4 weeks  2  Patient will report abdominal pain < 5/10 at worst within 4 weeks  LTG  1  Patient will report 50% improvement in symptoms of pelvic pain over 12 weeks  2  Patinet will report <2/10 pain with intercourse in 12 weeks  3  Patient will demonstrate independence and compliance with self- management strategies upon discharge within 12 weeks      Plan  Patient would benefit from: skilled physical therapy  Referral necessary: No  Planned modality interventions: biofeedback, TENS and thermotherapy: hydrocollator packs  Planned therapy interventions: abdominal trunk stabilization, manual therapy, joint mobilization, motor coordination training, neuromuscular re-education, therapeutic activities, therapeutic exercise, behavior modification, breathing training and home exercise program  Frequency: 1x week  Duration in weeks: 12  Plan of Care beginning date: 9/21/2020  Plan of Care expiration date: 12/14/2020  Treatment plan discussed with: patient        PT Pelvic Floor Subjective:   History of Present Illness:   Primary symptoms: urinary frequency, mild dysuria, lower abdominal discomfort, and pain with intercourse  Longstanding dx of IBS and interstitial cystitis, first symptoms in her 25s  When in an IC flare, symptoms intensify- stabbing vaginal pain, vaginal burning, burning pain with urination, and lower abdominal and vulva pressure/pain  Takes pyridium (1-2 doses) during flares  Knows that food and stress are triggers; started cheating on Dairy-Free and Gluten-Free diet in the last few months and symptoms have worsened  In 2020, has had 2-3 flares thus far, but has had two since COVID started  Self-treats with heating pad, leg elevation, and increased water intake  Symptoms of IBS including bowel urgency, triggered by food  Abdominal bloating, used to be cyclical with menstruation  Last year was started on Estrace  Takes postcoital trimethoprim  Longstanding venous varicosity of R posterior thigh- started after second pregnancy; reports radiating discomfort into R hip, R low back  Yoga poses and breathing techniques are helpful  Social Support:     Relationship status: /committed    Work status: retired  OB/ gyn History    Gestational History:     Number of prior pregnancies: 4    Number of term pregnancies: 3    Delivery Type: vaginal delivery      Number of vaginal deliveries: 3    Delivery Complications: Forceps-assisted delivery of first child  Episiotomy with 1st child; does not recall laceration with last 2 deliveries       Menstrual History:      Menopausal: menopause  Bladder Function:     Voiding Difficulties positive for: frequent urination and incomplete emptying (presses on abdomen suprapubically)      Voiding Difficulties negative for: urgency, hesitancy, straining and paruresis      Voiding Difficulties comments:     Voiding frequency: every 1-2 hours ("just in case"; 10 voids per day)    Urinary leakage: urine leakage (rarely)    Urinary leakage aggravated by: post-void dribble    Urinary leakage not aggravated by: coughing, sneezing, bending, exercise, standing up, walking to the bathroom, hearing running water and key-in-the-door syndrome    Nocturia (episodes per night): 1    Painful urination: No      Fluid Intake Type: Water  Incontinence Management:     Pads/Diaper Use:  None  Bowel Function:     Voiding DIfficulties: urgency and constipation      Bowel Function comments:  During IBS flare, symptoms may be very loose (#6) or constipation with BSS #4; pain relief (lower abdominal ) after defecation      Bowel frequency: daily    Victoria Stool Scale: type 3 and type 4  Sexual Function:     Sexually Active:  Sexually active    Pain during intercourse: Yes      Lubrication Use: Yes    pain causes abstinence    Patient wishes to return to having intercourse: currently unable to have intercourse but wants to  Pain:     Current pain ratin    At best pain ratin    At worst pain ratin    Location:  Lower abdominal tenderness  Diagnostic Tests:     Cystoscopy: normal  Treatments:     None    Patient Goals:     Patient goals for therapy:  Improved pain management, improved bladder or bowel function and return to sport/leisure activities    Other patient goals:  Manage urinary frequency      Objective     Postural Observations  Seated posture: good  Standing posture: good        Active Range of Motion     Lumbar   Flexion:  WFL  Extension:  Restriction level: minimal  Left lateral flexion:  WFL  Right lateral flexion:  WFL  Left rotation:  [facility] rotation:  [facility]    Joint Play     Hypomobile: L3, L4 and L5     Strength/Myotome Testing     Lumbar   Left   Heel walk: normal  Toe walk: normal    Right   Heel walk: normal  Toe walk: normal    Left Hip   Planes of Motion   Flexion: WFL  Extension: WFL  Abduction: WFL    Right Hip   Planes of Motion   Flexion: WFL  Extension: WFL  Abduction: WFL    Left Knee   Flexion: WFL  Extension: WFL    Right Knee   Flexion: WFL  Extension: [facility]    Muscle Activation     Additional Muscle Activation Details  Fair multifidi activation ROBYN  Pelvic Floor Exam   Abdominal assessment: To be assessed    General Perineum Exam:     General perineum exam comments:  To be assessed    SMEG Biofeedback   to be assessed next treatment               Precautions: standard      Manuals 9/21            PF exam nv            abd exam nv                                      Neuro Re-Ed             DB             PF NMR nv                                                                             Ther Ex             Júnior pose 5'            Cat cow x20            Prone press up x10                                                                             Ther Activity             Education- PF anatomy & function 10'            Voiding diaries w instructions provided            Bladder health & habits nv            Defecation mechanics                          Modalities

## 2020-09-28 ENCOUNTER — OFFICE VISIT (OUTPATIENT)
Dept: PHYSICAL THERAPY | Facility: REHABILITATION | Age: 65
End: 2020-09-28
Payer: COMMERCIAL

## 2020-09-28 DIAGNOSIS — M62.89 PELVIC FLOOR DYSFUNCTION: Primary | ICD-10-CM

## 2020-09-28 DIAGNOSIS — R10.2 PELVIC PAIN: ICD-10-CM

## 2020-09-28 PROCEDURE — 97110 THERAPEUTIC EXERCISES: CPT | Performed by: PHYSICAL THERAPIST

## 2020-09-28 PROCEDURE — 97112 NEUROMUSCULAR REEDUCATION: CPT | Performed by: PHYSICAL THERAPIST

## 2020-09-28 PROCEDURE — 97140 MANUAL THERAPY 1/> REGIONS: CPT | Performed by: PHYSICAL THERAPIST

## 2020-09-28 NOTE — PROGRESS NOTES
Daily Note     Today's date: 2020  Patient name: Osker Goldmann  : 1955  MRN: [id number]  Referring provider: Osvaldo Gibbons MD  Dx:   Encounter Diagnosis     ICD-10-CM    1  Pelvic floor dysfunction  M62 89    2  Pelvic pain  R10 2        Start Time: 1100  Stop Time: 1200  Total time in clinic (min): 60 minutes    Subjective: Flare in IBS symptoms have improved since IE  Brings voiding/food diaries  Reports 3 BM this morning (solid stool, eating more fiber I e  oatmeal for breakfast), however noted increased vulvar burning after 3rd BM  Objective: See treatment diary below  PFM exam:  - baseline vulvar burning 2/10, abolished with ROBYN adductor MFR  - baseline resting activity: mild hypertonus layer 1 with 4/10 from 5-8 o'clock (L>R side)  - low tone levator ani ROBYN- asymptomatic   - mild anterior vaginal prolapse at rest in hooklying, moderate laxity and bulge with cough  - PF isolated cxn: fair w cocontraction of adductors and paradoxical breath pattern  - bear down: breath holding with PF cxn, moderate anterior vaginal bulge to introitus  - PERFECT 2/2/3/NT  - resting tone post-contraction- mildly elevated for layer 1    Assessment: Tolerated treatment well  PFM exam yields layer 1 overactivity with nerve pain, PF incoordination, and PF weakness toshia in levator ani  There is a moderate anterior vaginal wall prolapse  Patient exhibited good technique with therapeutic exercises for PF AROM coordinating with breathing, following tactile and verbal cueing  Patient to initiate self-manuals for layer 1 pain and PF AROM exercises supine and seated for PF retraining and strengthening  Plan: Continue per plan of care  Review voiding diaries next visit       Precautions: standard  HEP: : self manuals 10 min/day; PF AROM with DB 2x10 supine and 2x10 seated    Manuals            PF exam nv 20'           abd exam nv nv           PF STM  10' layer 1 on L                        Neuro Re-Ed DB  5'           PF AROM NMR nv 2x10 rep w exhale supine  x10 reps seated  Tactile BFB                                                                            Ther Ex             Júnior pose 5' HEP           Cat cow x20 HEP           Prone press up x10 HEP           Self manuals PF STM  practiced 10 min                                                               Ther Activity             Education- PF anatomy & function 10' Reviewed 5'           Voiding diaries w instructions provided nv * review this visit          Bladder health & habits  nv           Defecation mechanics  nv                        Modalities

## 2020-10-05 ENCOUNTER — OFFICE VISIT (OUTPATIENT)
Dept: PHYSICAL THERAPY | Facility: REHABILITATION | Age: 65
End: 2020-10-05
Payer: COMMERCIAL

## 2020-10-05 DIAGNOSIS — M62.89 PELVIC FLOOR DYSFUNCTION: Primary | ICD-10-CM

## 2020-10-05 DIAGNOSIS — R10.2 PELVIC PAIN: ICD-10-CM

## 2020-10-05 PROCEDURE — 97112 NEUROMUSCULAR REEDUCATION: CPT | Performed by: PHYSICAL THERAPIST

## 2020-10-05 PROCEDURE — 97140 MANUAL THERAPY 1/> REGIONS: CPT | Performed by: PHYSICAL THERAPIST

## 2020-10-19 ENCOUNTER — OFFICE VISIT (OUTPATIENT)
Dept: PHYSICAL THERAPY | Facility: REHABILITATION | Age: 65
End: 2020-10-19
Payer: COMMERCIAL

## 2020-10-19 DIAGNOSIS — R10.2 PELVIC PAIN: ICD-10-CM

## 2020-10-19 DIAGNOSIS — M62.89 PELVIC FLOOR DYSFUNCTION: Primary | ICD-10-CM

## 2020-10-19 PROCEDURE — 97112 NEUROMUSCULAR REEDUCATION: CPT | Performed by: PHYSICAL THERAPIST

## 2020-10-19 PROCEDURE — 97110 THERAPEUTIC EXERCISES: CPT | Performed by: PHYSICAL THERAPIST

## 2020-10-19 PROCEDURE — 97140 MANUAL THERAPY 1/> REGIONS: CPT | Performed by: PHYSICAL THERAPIST

## 2020-10-26 ENCOUNTER — OFFICE VISIT (OUTPATIENT)
Dept: PHYSICAL THERAPY | Facility: REHABILITATION | Age: 65
End: 2020-10-26
Payer: COMMERCIAL

## 2020-10-26 DIAGNOSIS — M62.89 PELVIC FLOOR DYSFUNCTION: Primary | ICD-10-CM

## 2020-10-26 DIAGNOSIS — R10.2 PELVIC PAIN: ICD-10-CM

## 2020-10-26 PROCEDURE — 97112 NEUROMUSCULAR REEDUCATION: CPT | Performed by: PHYSICAL THERAPIST

## 2020-11-02 ENCOUNTER — OFFICE VISIT (OUTPATIENT)
Dept: PHYSICAL THERAPY | Facility: REHABILITATION | Age: 65
End: 2020-11-02
Payer: COMMERCIAL

## 2020-11-02 DIAGNOSIS — R10.2 PELVIC PAIN: ICD-10-CM

## 2020-11-02 DIAGNOSIS — M62.89 PELVIC FLOOR DYSFUNCTION: Primary | ICD-10-CM

## 2020-11-02 PROCEDURE — 97110 THERAPEUTIC EXERCISES: CPT | Performed by: PHYSICAL THERAPIST

## 2020-11-09 ENCOUNTER — OFFICE VISIT (OUTPATIENT)
Dept: PHYSICAL THERAPY | Facility: REHABILITATION | Age: 65
End: 2020-11-09
Payer: COMMERCIAL

## 2020-11-09 DIAGNOSIS — M62.89 PELVIC FLOOR DYSFUNCTION: Primary | ICD-10-CM

## 2020-11-09 DIAGNOSIS — R10.2 PELVIC PAIN: ICD-10-CM

## 2020-11-09 PROCEDURE — 97110 THERAPEUTIC EXERCISES: CPT | Performed by: PHYSICAL THERAPIST

## 2020-11-16 ENCOUNTER — APPOINTMENT (OUTPATIENT)
Dept: PHYSICAL THERAPY | Facility: REHABILITATION | Age: 65
End: 2020-11-16
Payer: COMMERCIAL

## 2020-11-23 ENCOUNTER — EVALUATION (OUTPATIENT)
Dept: PHYSICAL THERAPY | Facility: REHABILITATION | Age: 65
End: 2020-11-23
Payer: COMMERCIAL

## 2020-11-23 DIAGNOSIS — N81.89 PELVIC FLOOR WEAKNESS: ICD-10-CM

## 2020-11-23 DIAGNOSIS — R29.898 PELVIC GIRDLE WEAKNESS: ICD-10-CM

## 2020-11-23 DIAGNOSIS — M62.89 PELVIC FLOOR DYSFUNCTION: Primary | ICD-10-CM

## 2020-11-23 DIAGNOSIS — R10.2 PELVIC PAIN: ICD-10-CM

## 2020-11-23 PROCEDURE — 97750 PHYSICAL PERFORMANCE TEST: CPT | Performed by: PHYSICAL THERAPIST

## 2020-11-23 PROCEDURE — 97530 THERAPEUTIC ACTIVITIES: CPT | Performed by: PHYSICAL THERAPIST

## 2020-11-23 PROCEDURE — 97112 NEUROMUSCULAR REEDUCATION: CPT | Performed by: PHYSICAL THERAPIST

## 2020-11-30 ENCOUNTER — APPOINTMENT (OUTPATIENT)
Dept: PHYSICAL THERAPY | Facility: REHABILITATION | Age: 65
End: 2020-11-30
Payer: COMMERCIAL

## 2020-12-07 ENCOUNTER — OFFICE VISIT (OUTPATIENT)
Dept: PHYSICAL THERAPY | Facility: REHABILITATION | Age: 65
End: 2020-12-07
Payer: COMMERCIAL

## 2020-12-07 DIAGNOSIS — M62.89 PELVIC FLOOR DYSFUNCTION: Primary | ICD-10-CM

## 2020-12-07 DIAGNOSIS — R10.2 PELVIC PAIN: ICD-10-CM

## 2020-12-07 PROCEDURE — 97110 THERAPEUTIC EXERCISES: CPT | Performed by: PHYSICAL THERAPIST

## 2020-12-07 PROCEDURE — 97530 THERAPEUTIC ACTIVITIES: CPT | Performed by: PHYSICAL THERAPIST

## 2020-12-07 PROCEDURE — 97140 MANUAL THERAPY 1/> REGIONS: CPT | Performed by: PHYSICAL THERAPIST

## 2020-12-11 ENCOUNTER — TELEPHONE (OUTPATIENT)
Dept: UROLOGY | Facility: MEDICAL CENTER | Age: 65
End: 2020-12-11

## 2020-12-11 DIAGNOSIS — R39.9 UTI SYMPTOMS: Primary | ICD-10-CM

## 2020-12-11 DIAGNOSIS — R30.0 DYSURIA: ICD-10-CM

## 2020-12-11 DIAGNOSIS — R30.0 BURNING WITH URINATION: ICD-10-CM

## 2020-12-11 DIAGNOSIS — R39.15 URINARY URGENCY: ICD-10-CM

## 2020-12-14 ENCOUNTER — LAB (OUTPATIENT)
Dept: LAB | Facility: CLINIC | Age: 65
End: 2020-12-14
Payer: COMMERCIAL

## 2020-12-14 ENCOUNTER — TELEMEDICINE (OUTPATIENT)
Dept: PHYSICAL THERAPY | Facility: REHABILITATION | Age: 65
End: 2020-12-14
Payer: COMMERCIAL

## 2020-12-14 DIAGNOSIS — R39.15 URINARY URGENCY: ICD-10-CM

## 2020-12-14 DIAGNOSIS — R30.0 DYSURIA: ICD-10-CM

## 2020-12-14 DIAGNOSIS — R39.9 UTI SYMPTOMS: ICD-10-CM

## 2020-12-14 DIAGNOSIS — M62.89 PELVIC FLOOR DYSFUNCTION: Primary | ICD-10-CM

## 2020-12-14 DIAGNOSIS — R30.0 BURNING WITH URINATION: ICD-10-CM

## 2020-12-14 DIAGNOSIS — R10.2 PELVIC PAIN: ICD-10-CM

## 2020-12-14 LAB
BACTERIA UR QL AUTO: ABNORMAL /HPF
BILIRUB UR QL STRIP: NEGATIVE
CLARITY UR: ABNORMAL
COLOR UR: YELLOW
GLUCOSE UR STRIP-MCNC: NEGATIVE MG/DL
HGB UR QL STRIP.AUTO: ABNORMAL
HYALINE CASTS #/AREA URNS LPF: ABNORMAL /LPF
KETONES UR STRIP-MCNC: NEGATIVE MG/DL
LEUKOCYTE ESTERASE UR QL STRIP: ABNORMAL
NITRITE UR QL STRIP: NEGATIVE
NON-SQ EPI CELLS URNS QL MICRO: ABNORMAL /HPF
PH UR STRIP.AUTO: 6.5 [PH]
PROT UR STRIP-MCNC: NEGATIVE MG/DL
RBC #/AREA URNS AUTO: ABNORMAL /HPF
SP GR UR STRIP.AUTO: 1.01 (ref 1–1.03)
UROBILINOGEN UR QL STRIP.AUTO: 0.2 E.U./DL
WBC #/AREA URNS AUTO: ABNORMAL /HPF

## 2020-12-14 PROCEDURE — 97530 THERAPEUTIC ACTIVITIES: CPT | Performed by: PHYSICAL THERAPIST

## 2020-12-14 PROCEDURE — 87086 URINE CULTURE/COLONY COUNT: CPT

## 2020-12-14 PROCEDURE — 97110 THERAPEUTIC EXERCISES: CPT | Performed by: PHYSICAL THERAPIST

## 2020-12-14 PROCEDURE — 81001 URINALYSIS AUTO W/SCOPE: CPT

## 2020-12-15 LAB — BACTERIA UR CULT: ABNORMAL

## 2020-12-15 RX ORDER — TRIMETHOPRIM 100 MG/1
100 TABLET ORAL 2 TIMES DAILY
Qty: 20 TABLET | Refills: 0 | Status: SHIPPED | OUTPATIENT
Start: 2020-12-15 | End: 2020-12-25

## 2020-12-15 RX ORDER — CIPROFLOXACIN 500 MG/1
500 TABLET, FILM COATED ORAL EVERY 12 HOURS SCHEDULED
Qty: 14 TABLET | Refills: 0 | Status: SHIPPED | OUTPATIENT
Start: 2020-12-15 | End: 2020-12-15

## 2020-12-17 ENCOUNTER — APPOINTMENT (OUTPATIENT)
Dept: PHYSICAL THERAPY | Facility: REHABILITATION | Age: 65
End: 2020-12-17
Payer: COMMERCIAL

## 2020-12-21 ENCOUNTER — APPOINTMENT (OUTPATIENT)
Dept: PHYSICAL THERAPY | Facility: REHABILITATION | Age: 65
End: 2020-12-21
Payer: COMMERCIAL

## 2020-12-29 ENCOUNTER — APPOINTMENT (OUTPATIENT)
Dept: PHYSICAL THERAPY | Facility: REHABILITATION | Age: 65
End: 2020-12-29
Payer: COMMERCIAL

## 2021-01-04 ENCOUNTER — OFFICE VISIT (OUTPATIENT)
Dept: PHYSICAL THERAPY | Facility: REHABILITATION | Age: 66
End: 2021-01-04
Payer: COMMERCIAL

## 2021-01-04 DIAGNOSIS — M62.89 PELVIC FLOOR DYSFUNCTION: Primary | ICD-10-CM

## 2021-01-04 DIAGNOSIS — R10.2 PELVIC PAIN: ICD-10-CM

## 2021-01-04 PROCEDURE — 97530 THERAPEUTIC ACTIVITIES: CPT | Performed by: PHYSICAL THERAPIST

## 2021-01-04 PROCEDURE — 97110 THERAPEUTIC EXERCISES: CPT | Performed by: PHYSICAL THERAPIST

## 2021-01-04 NOTE — PROGRESS NOTES
Daily Note     Today's date: 2021  Patient name: Eliecer Taylor  : 1955  MRN: [id number]  Referring provider: Ramiro Diaz MD  Dx:   Encounter Diagnosis     ICD-10-CM    1  Pelvic floor dysfunction  M62 89    2  Pelvic pain  R10 2        Start Time: 0408  Stop Time: 1200  Total time in clinic (min): 55 minutes    Subjective: Abdominal discomfort after eating corn, sugar, and salty ham  Otherwise, feels no bladder symptoms or pelvic pain since treated for UTI 3 weeks ago  Urinary urgency and frequency has resolved  Performing strengthening exercises a few times per week  Objective: See treatment diary below  Assessment: Tolerated treatment well  Progressed lumbopelvic strengthening as patient demonstrates improved strength and balance  Discussed merit of topical estrogen creams for vulvar hormonal replacement and I endorse her OBGYN and urologist's recommendations  Plan: discharge next visit  Review self-manuals and other self mgmt strategies       Precautions: standard    Manuals 21    PF exam        abd manual therapy  ILU massage 10 min  LLQ MFR 10 min      PF STM  15 min L urogenital              Neuro Re-Ed        DB        PF AROM NMR        sEMG BFB programs for PF uptraining        Bear down mechanics nv               Ther Ex        Júnior pose 2'  Reviewed for HEP     Cat cow 2'  Reviewed for HEP     Prone press up x10  Reviewed for HEP     ILU abdominal massage  Practiced 5 minutes Reviewed for HEP     Self manuals PF STM HEP reviewed Reviewed 15 minutes; discussed dilators Reviewed 15 min **   Bridge+PF HEP   x10 ROBYN, x10 uni    Bird Dog HEP   x10 ea side    Squat HEP   x20 w chair    Lateral step down HEP   6 in x20 ea    Resisted abd    Green TB sidestepping x30 steps each; in place x20 ea    PFM strengthening w sEMG BFB    nv **           Ther Activity        Education- PF anatomy & function Reviewed menopausal changes  reviewed     Bladder health & habits Reviewed post-void kegels for UI; 2-4 hr void interval reviewed reviewed reviewed    Defecation mechanics discussed use of breathing w/o pushing  nv     Sex therapy discussed       Modalities                            Manuals 9/21 9/28 10/5 10/19 10/26 11/2 11/9   PF exam nv 20'        abd exam nv nv        PF STM  10' layer 1 on L 15' L Layer 1-2  15' L layer 1-2                Neuro Re-Ed          DB  5' 3'       PF AROM NMR nv 2x10 rep w exhale supine  x10 reps seated  Tactile BFB 3x10 reps supine 3" x20 supine w exhale      sEMG BFB programs for PF uptraining    Supine  5" x10 w BFB  QF x10 Supine QF, 5" hold, 10" hold  Sitting QF, 5" hold  Standing 5" hold Supine  5" hold, Sitting QF, 5" hold, 10" hold;  Standing 5" hold HEP seated                       Ther Ex          Júnior pose 5' HEP HEP HEP PRN  3' HEP PRN   Cat cow x20 HEP HEP HEP PRN  HEP HEP PRN   Prone press up x10 HEP HEP HEP PRN  HEP HEP PRN   Self manuals PF STM  practiced 10 min reviewed reviewed L periurethral  HEP HEP 3-4x/week   TA+ PF   x10 supine x10 supine and seated  dc    Bridge+PF   nv x10  x30 ROBYN  x10 Uni x10 uni   Add+PF   nv x10 supine and seated  dc    Bird Dog       x20 ea   Squat       Chair 2x20; body weight 2x20; w GTB x40   Lateral step down       6 in 2x10 ea w UE support                       Ther Activity          Education- PF anatomy & function 10' Reviewed 5'  reviewed      Voiding diaries w instructions provided nv reviewed 5'       Bladder health & habits  nv 2-4 hr void interval and 50% BW water intake rec reviewed      Defecation mechanics                    Modalities

## 2021-01-11 ENCOUNTER — APPOINTMENT (OUTPATIENT)
Dept: PHYSICAL THERAPY | Facility: REHABILITATION | Age: 66
End: 2021-01-11
Payer: COMMERCIAL

## 2021-01-14 ENCOUNTER — APPOINTMENT (OUTPATIENT)
Dept: PHYSICAL THERAPY | Facility: REHABILITATION | Age: 66
End: 2021-01-14
Payer: COMMERCIAL

## 2021-01-18 ENCOUNTER — APPOINTMENT (OUTPATIENT)
Dept: PHYSICAL THERAPY | Facility: REHABILITATION | Age: 66
End: 2021-01-18
Payer: COMMERCIAL

## 2021-01-25 ENCOUNTER — OFFICE VISIT (OUTPATIENT)
Dept: PHYSICAL THERAPY | Facility: REHABILITATION | Age: 66
End: 2021-01-25
Payer: COMMERCIAL

## 2021-01-25 DIAGNOSIS — M62.89 PELVIC FLOOR DYSFUNCTION: Primary | ICD-10-CM

## 2021-01-25 DIAGNOSIS — R10.2 PELVIC PAIN: ICD-10-CM

## 2021-01-25 PROCEDURE — 97530 THERAPEUTIC ACTIVITIES: CPT | Performed by: PHYSICAL THERAPIST

## 2021-01-25 PROCEDURE — 97110 THERAPEUTIC EXERCISES: CPT | Performed by: PHYSICAL THERAPIST

## 2021-01-25 NOTE — PROGRESS NOTES
Daily Note/Discharge     Today's date: 2021  Patient name: Lalita Estrada  : 1955  MRN: [id number]  Referring provider: Vivian Henson MD  Dx:   Encounter Diagnosis     ICD-10-CM    1  Pelvic floor dysfunction  M62 89    2  Pelvic pain  R10 2        Start Time: 1000  Stop Time: 1100  Total time in clinic (min): 60 minutes    Subjective: "I have strategies and they are helpful in managing my symptoms " Bladder pain symptoms are intermittent and rare  Continues walking daily, yoga is less frequently  Drinking more water consistently (6-8x 8 oz glasses/day), but has "cheated" on diet  Urinary void interval during day is consistently 2-4 hr without symptoms  Nocturia is resolved  Post-void UI resumed but is resolved by post-void kegels  Using diaphragmatic breathing to help with voiding bladder and bowels  Has not felt constipation in the last month but sometimes mild increased difficulty     Objective: See treatment diary below  Assessment: Ms Jae Hager demonstrates independence and compliance with self-management strategies for painful bladder syndrome, with improved voiding behaviors, decreased daily pain, and decreased constipation  She reports ongoing dyspareunia with a psychoemotional component and today we spent our session discussing strategies to improve tolerance and confidence in physical component of sexual activity (self-stretching, use of vaginal moisturizer/hormonal replacement, per GYN recommendations)  Also discussed strategies for improving the psychoemotional component, but recommending that patient consult a trained psychologist/sex therapist for further advice  She is receptive and ready for discharge at this time  STG  1  Urinary void interval 2-4 hours without increased pain within 4 weeks  MET  2  Patient will report abdominal pain < 5/10 at worst within 4 weeks  MET  3  No post-void UI in 4 weeks -PROGRESSED    LTG  1   Patient will report 50% improvement in symptoms of pelvic pain over 12 weeks  MET 11/23/20  2  Patinet will report <2/10 pain with intercourse in 12 weeks  NOT MET  3  Patient will demonstrate independence and compliance with self- management strategies upon discharge within 12 weeks  MET  4   Dysuria occurring <2x/month- NEW 11/23    Plan: discharge today     Precautions: standard    Manuals 11/23 12/7 12/14 1/4/21 1/25   PF exam        abd manual therapy  ILU massage 10 min  LLQ MFR 10 min      PF STM  15 min L urogenital              Neuro Re-Ed        DB        PF AROM NMR        sEMG BFB programs for PF uptraining        Bear down mechanics nv    reviewed           Ther Ex        Júnior pose 2'  Reviewed for HEP     Cat cow 2'  Reviewed for HEP     Prone press up x10  Reviewed for HEP     ILU abdominal massage  Practiced 5 minutes Reviewed for HEP  reviewed   Self manuals PF STM HEP reviewed Reviewed 15 minutes; discussed dilators Reviewed 15 min Reviewed 15 min   Bridge+PF HEP   x10 ROBYN, x10 uni    Bird Dog HEP   x10 ea side    Squat HEP   x20 w chair    Lateral step down HEP   6 in x20 ea    Resisted abd    Green TB sidestepping x30 steps each; in place x20 ea    PFM strengthening w sEMG BFB    nv **           Ther Activity        Education- PF anatomy & function Reviewed menopausal changes  reviewed  reviewed   Bladder health & habits Reviewed post-void kegels for UI; 2-4 hr void interval reviewed reviewed reviewed reviewed   Defecation mechanics discussed use of breathing w/o pushing  nv  reviewed   Sex therapy discussed    discussed   Modalities                            Manuals 9/21 9/28 10/5 10/19 10/26 11/2 11/9   PF exam nv 20'        abd exam nv nv        PF STM  10' layer 1 on L 15' L Layer 1-2  15' L layer 1-2                Neuro Re-Ed          DB  5' 3'       PF AROM NMR nv 2x10 rep w exhale supine  x10 reps seated  Tactile BFB 3x10 reps supine 3" x20 supine w exhale      sEMG BFB programs for PF uptraining    Supine  5" x10 w BFB  QF x10 Supine QF, 5" hold, 10" hold  Sitting QF, 5" hold  Standing 5" hold Supine  5" hold, Sitting QF, 5" hold, 10" hold;  Standing 5" hold HEP seated                       Ther Ex          Júnior pose 5' HEP HEP HEP PRN  3' HEP PRN   Cat cow x20 HEP HEP HEP PRN  HEP HEP PRN   Prone press up x10 HEP HEP HEP PRN  HEP HEP PRN   Self manuals PF STM  practiced 10 min reviewed reviewed L periurethral  HEP HEP 3-4x/week   TA+ PF   x10 supine x10 supine and seated  dc    Bridge+PF   nv x10  x30 ROBYN  x10 Uni x10 uni   Add+PF   nv x10 supine and seated  dc    Bird Dog       x20 ea   Squat       Chair 2x20; body weight 2x20; w GTB x40   Lateral step down       6 in 2x10 ea w UE support                       Ther Activity          Education- PF anatomy & function 10' Reviewed 5'  reviewed      Voiding diaries w instructions provided nv reviewed 5'       Bladder health & habits  nv 2-4 hr void interval and 50% BW water intake rec reviewed      Defecation mechanics                    Modalities

## 2021-01-27 ENCOUNTER — APPOINTMENT (OUTPATIENT)
Dept: PHYSICAL THERAPY | Facility: REHABILITATION | Age: 66
End: 2021-01-27
Payer: COMMERCIAL

## 2021-03-30 DIAGNOSIS — Z23 ENCOUNTER FOR IMMUNIZATION: ICD-10-CM

## 2021-04-04 ENCOUNTER — NURSE TRIAGE (OUTPATIENT)
Dept: OTHER | Facility: OTHER | Age: 66
End: 2021-04-04

## 2021-04-04 DIAGNOSIS — N30.90 CYSTITIS: Primary | ICD-10-CM

## 2021-04-04 NOTE — TELEPHONE ENCOUNTER
Urine culture order placed  She will begin trimethoprim rx this evening after specimen provided at walk up lab

## 2021-04-04 NOTE — TELEPHONE ENCOUNTER
Reason for Disposition   Side (flank) or lower back pain present    Answer Assessment - Initial Assessment Questions  1  SYMPTOM: "What's the main symptom you're concerned about?" (e g , frequency, incontinence)       Burning, with urination, cloudy urinae, lower back pain   2  ONSET: "When did the  Urinary symptoms start start?"      3 days ago   3  PAIN: "Is there any pain?" If so, ask: "How bad is it?" (Scale: 1-10; mild, moderate, severe)      3 out of 10   4   CAUSE: "What do you think is causing the symptoms?"      Possible UTI   5  OTHER SYMPTOMS: "Do you have any other symptoms?" (e g , fever, flank pain, blood in urine, pain with urination)       Burning with urination    Protocols used: [facility]

## 2021-04-04 NOTE — TELEPHONE ENCOUNTER
Regarding: Possible UTI  ----- Message from Stan Perkins RN sent at 4/4/2021 12:20 PM EDT -----  " I am having burning with urination, cloudy urine, and lower back pain   Cori had chronic UTIs, and I would like to get an order for a urine test and culture before starting an abx"

## 2021-04-04 NOTE — TELEPHONE ENCOUNTER
AP [place] was made aware of patient's symptoms  Per JUDY Pugh, urine c/s script will be placed in Epic, patient may obtain urine test today, may start Trinethoprim 100 mg twice daily prescribed earlier by Dr Rachael Nguyễn for UTI, after urine test is obtained  Patient agreeable to advice, verbalized understanding  Patient is scheduled for Covid 19 test on coming Tuesday  Patient agreeable to call PCP office tomorrow to follow up on her vaccine question

## 2021-04-05 ENCOUNTER — APPOINTMENT (OUTPATIENT)
Dept: LAB | Facility: CLINIC | Age: 66
End: 2021-04-05
Payer: COMMERCIAL

## 2021-04-05 ENCOUNTER — TELEPHONE (OUTPATIENT)
Dept: UROLOGY | Facility: MEDICAL CENTER | Age: 66
End: 2021-04-05

## 2021-04-05 ENCOUNTER — TRANSCRIBE ORDERS (OUTPATIENT)
Dept: LAB | Facility: CLINIC | Age: 66
End: 2021-04-05

## 2021-04-05 DIAGNOSIS — N30.90 CYSTITIS: ICD-10-CM

## 2021-04-05 DIAGNOSIS — R39.9 UTI SYMPTOMS: Primary | ICD-10-CM

## 2021-04-05 PROCEDURE — 81001 URINALYSIS AUTO W/SCOPE: CPT

## 2021-04-05 PROCEDURE — 87086 URINE CULTURE/COLONY COUNT: CPT

## 2021-04-05 NOTE — TELEPHONE ENCOUNTER
DUPLICATE ENCOUNTER  Message copied and pasted into original healthcall encounter dated 4/4/21  Please refer to that encounter for documentation  DO NOT DOCUMENT FURTHER ON THIS ENCOUNTER

## 2021-04-05 NOTE — TELEPHONE ENCOUNTER
Patient spoke to On Call provider last evening and thought an urine analysis was also going to be ordered  Patient wants to know if there can be an urine analysis ordered as well before she goes to the lab today   [phone]

## 2021-04-05 NOTE — TELEPHONE ENCOUNTER
Simon Rao MA routed conversation to [facility] 13 minutes ago (8:29 AM)      Simon Rao MA 13 minutes ago (8:29 AM)        Patient spoke to On Call provider last evening and thought an urine analysis was also going to be ordered  Patient wants to know if there can be an urine analysis ordered as well before she goes to the lab today   [place], [place] [phone]  Simon Rao MA

## 2021-04-05 NOTE — TELEPHONE ENCOUNTER
UA with micro order placed  Tried to call pt to notify, received recording call unable to be completed  Unable to LM

## 2021-04-06 LAB — BACTERIA UR CULT: ABNORMAL

## 2021-04-06 NOTE — TELEPHONE ENCOUNTER
Pt checking status of previous message as it is close to end of day and she's still having symptoms

## 2021-04-07 NOTE — TELEPHONE ENCOUNTER
Received a phone message that patient is very upset that no one has addressed her urine culture performed 04/05/2021  Upon review of urine culture results she is positive for a very small amount of gram negative non lactose fermenting rods and was started on trimethoprim 04/04/2021 by physician assistant      Prakash Rothman call to assess current urinary symptoms while taking  trimethoprim

## 2021-04-07 NOTE — TELEPHONE ENCOUNTER
Patient requesting results as soon as possible  Patient is leaving town to take care of her 80year old mother and needs medication prior    Please advise

## 2021-04-07 NOTE — TELEPHONE ENCOUNTER
[9:50 AM] Carolina Acharya        What symptoms is she currently experiencing, her urine culture is positive for a very very very small amount of gram-negative  nonlactose fermenting rods  not something needing antibiotics  [9:53 AM] Deanlacey Acharya    she what her symptoms are   make sure she is drinking at least 40 oz of water per day weakening glued Pyridium  Generally the non lactose fermenting gram-negative rods is more of a vaginal bacteria than anything else and  trimethoprim is sufficient enough to manage her culture results      Call placed to patient and spoke with her  Informed her of the recommendations of the CRNP at this time and the medication she was prescribed by VLAD on 4-4-2021 is sufficient enough to treat organism that was found in her urine  Pt understands and will take medication as prescribed  She is requesting to have repeat urine testing done 1 week s/p antibiotic therapy to ensure bacteria has been treated  Orders were placed for patient to have testing done  She will call the office to follow up once results have been collected and finalized

## 2021-04-13 NOTE — TELEPHONE ENCOUNTER
Spoke to pt experiencing urinary urgency, burning and spasms  She has been taking trimethoprim for UC results from 4/5/21 for non lactose fermenting gram negative rods  Pt was advised to go for another UC on Saturday after completing antibiotic tomorrow  Pt stated she was diagnosed with bacterial vaginosis in the past by her gynecologist and asked if she should make an appt with gyno  Advised pt this was appropriate to rule our this may be causing her symptoms  She stated she is drinking 60 oz of water a day and does not drink any acidic beverages  OV note from Dr Conor Kumar from 8/25/20 for same symptoms  He notes IC is triggered by her IBS attacks  Pt stated she has seen a gastroenterologist  Dr Conor Kumar started her on estrace cream and recommended Kefir with postcoital trimethoprim  She has yearly appt with Dr Conor Kumar in August      Pt to be called with results of UC and follow up

## 2021-04-13 NOTE — TELEPHONE ENCOUNTER
Patient returned call to inform clinical she is having pain when she urinates like bladder spasms   Patient can be reached at [phone]

## 2021-04-16 NOTE — TELEPHONE ENCOUNTER
Pt called stating urinalysis was suppose to be ordered as well as culture she would like order entered also she's questioning time frame of having done post antibiotic she said she was given two different instructions,please call to clarify

## 2021-04-16 NOTE — TELEPHONE ENCOUNTER
Call placed to patient and spoke with her  Informed her that UA has been ordered  Advised patient that it is recommended that repeat urine testing be completed at least 3-5 days after completion of antibiotic therapy  Pt finished her antibiotic on Wednesday and will be going Sunday for repeat testing  She will follow up with the office on Tuesday for results and if any further action is needed  Pt has been taking Pyridium  She will plan to stop this medication today to ensure that UA and UC results are not altered

## 2021-04-18 ENCOUNTER — APPOINTMENT (OUTPATIENT)
Dept: LAB | Facility: HOSPITAL | Age: 66
End: 2021-04-18
Payer: COMMERCIAL

## 2021-04-18 LAB
BACTERIA UR QL AUTO: ABNORMAL /HPF
BILIRUB UR QL STRIP: NEGATIVE
CLARITY UR: ABNORMAL
COLOR UR: YELLOW
GLUCOSE UR STRIP-MCNC: NEGATIVE MG/DL
HGB UR QL STRIP.AUTO: NEGATIVE
KETONES UR STRIP-MCNC: NEGATIVE MG/DL
LEUKOCYTE ESTERASE UR QL STRIP: ABNORMAL
NITRITE UR QL STRIP: NEGATIVE
NON-SQ EPI CELLS URNS QL MICRO: ABNORMAL /HPF
PH UR STRIP.AUTO: 6 [PH]
PROT UR STRIP-MCNC: NEGATIVE MG/DL
RBC #/AREA URNS AUTO: ABNORMAL /HPF
SP GR UR STRIP.AUTO: <=1.005 (ref 1–1.03)
UROBILINOGEN UR QL STRIP.AUTO: 0.2 E.U./DL
WBC #/AREA URNS AUTO: ABNORMAL /HPF

## 2021-04-18 PROCEDURE — 87077 CULTURE AEROBIC IDENTIFY: CPT

## 2021-04-18 PROCEDURE — 87086 URINE CULTURE/COLONY COUNT: CPT

## 2021-04-18 PROCEDURE — 81001 URINALYSIS AUTO W/SCOPE: CPT

## 2021-04-18 PROCEDURE — 87186 SC STD MICRODIL/AGAR DIL: CPT

## 2021-04-19 ENCOUNTER — TELEPHONE (OUTPATIENT)
Dept: UROLOGY | Facility: AMBULATORY SURGERY CENTER | Age: 66
End: 2021-04-19

## 2021-04-19 DIAGNOSIS — N39.0 URINARY TRACT INFECTION WITHOUT HEMATURIA, SITE UNSPECIFIED: Primary | ICD-10-CM

## 2021-04-19 RX ORDER — TRIMETHOPRIM 100 MG/1
100 TABLET ORAL 2 TIMES DAILY
Qty: 14 TABLET | Refills: 0 | Status: SHIPPED | OUTPATIENT
Start: 2021-04-19 | End: 2021-04-26

## 2021-04-19 NOTE — TELEPHONE ENCOUNTER
Current antibiotics that she is on is effective against the bacteria she has on her urine testing  I would have her continue these antibiotics for another week    Otherwise, follow-up in the office as scheduled

## 2021-04-19 NOTE — TELEPHONE ENCOUNTER
Call placed to patient and spoke with her  Informed her of the recommendations of the CRNP  Pt understands and is aware  She is asking if repeat testing needs to be completed at the end of this course of antibiotics since she has been dealing with this for a few weeks now  Will route to provider for review and recommendations

## 2021-04-20 LAB — BACTERIA UR CULT: ABNORMAL

## 2021-04-21 DIAGNOSIS — N30.00 ACUTE CYSTITIS WITHOUT HEMATURIA: Primary | ICD-10-CM

## 2021-04-21 RX ORDER — AMOXICILLIN AND CLAVULANATE POTASSIUM 875; 125 MG/1; MG/1
1 TABLET, FILM COATED ORAL EVERY 12 HOURS SCHEDULED
Qty: 14 TABLET | Refills: 0 | Status: SHIPPED | OUTPATIENT
Start: 2021-04-21 | End: 2021-04-28

## 2021-04-21 NOTE — TELEPHONE ENCOUNTER
Pls let her know I sent Augmentin to her pharmacy- stop the Trimethoprim- I am in OR all day with 8 cases and can't call her

## 2021-04-21 NOTE — TELEPHONE ENCOUNTER
Call placed to patient and spoke with her  Informed her of the recommendations of Dr Heather Flores at this time and that prescription has been called into her pharmacy  Pt was thankful for this and had no further questions and does not need to speak to provider directly  All questions were answered

## 2021-04-21 NOTE — TELEPHONE ENCOUNTER
Call placed to patient and spoke with her  Informed her that message has been sent to Dr Yolanda Turcios for review  Dr Yolanda Turcios is in the OR today and informed the patient of this  She understands and will await a call back from the office with the recommendations of Dr Yolanda Turcios once they are reviewed and received  Pt is hoping for a call back today since she has been dealing with UTI since 4-6-2021 and is hoping to be placed on the correct antibiotic to get symptom relief  Routing to Dr Yolanda Turcios for further review

## 2021-04-21 NOTE — TELEPHONE ENCOUNTER
Pt called in again inquiring about her urine culture results and getting a medication  Pt also seen a gyn yesterday as per DR Mary Webster recommendation and gyn stated for her to check back with DR Mary Webster to make sure she is on the correct medication for this infection  Pt would really like to speak with Dr Mary Webster directly      Please call her back

## 2021-04-30 DIAGNOSIS — N39.0 URINARY TRACT INFECTION WITHOUT HEMATURIA, SITE UNSPECIFIED: Primary | ICD-10-CM

## 2021-04-30 NOTE — TELEPHONE ENCOUNTER
Patient called in stating she completed augmentin and asked when she go for her follow up UA and culture  Patient would need orders   Patient can be reached at  [phone]

## 2021-05-03 ENCOUNTER — APPOINTMENT (OUTPATIENT)
Dept: LAB | Facility: CLINIC | Age: 66
End: 2021-05-03
Payer: COMMERCIAL

## 2021-05-03 DIAGNOSIS — N39.0 URINARY TRACT INFECTION WITHOUT HEMATURIA, SITE UNSPECIFIED: ICD-10-CM

## 2021-05-03 LAB
BACTERIA UR QL AUTO: ABNORMAL /HPF
BILIRUB UR QL STRIP: NEGATIVE
CLARITY UR: CLEAR
COLOR UR: YELLOW
GLUCOSE UR STRIP-MCNC: NEGATIVE MG/DL
HGB UR QL STRIP.AUTO: NEGATIVE
HYALINE CASTS #/AREA URNS LPF: ABNORMAL /LPF
KETONES UR STRIP-MCNC: NEGATIVE MG/DL
LEUKOCYTE ESTERASE UR QL STRIP: ABNORMAL
NITRITE UR QL STRIP: POSITIVE
NON-SQ EPI CELLS URNS QL MICRO: ABNORMAL /HPF
PH UR STRIP.AUTO: 6 [PH]
PROT UR STRIP-MCNC: NEGATIVE MG/DL
RBC #/AREA URNS AUTO: ABNORMAL /HPF
SP GR UR STRIP.AUTO: 1.01 (ref 1–1.03)
UROBILINOGEN UR QL STRIP.AUTO: 0.2 E.U./DL
WBC #/AREA URNS AUTO: ABNORMAL /HPF

## 2021-05-03 PROCEDURE — 87086 URINE CULTURE/COLONY COUNT: CPT

## 2021-05-03 PROCEDURE — 87186 SC STD MICRODIL/AGAR DIL: CPT

## 2021-05-03 PROCEDURE — 87077 CULTURE AEROBIC IDENTIFY: CPT

## 2021-05-03 PROCEDURE — 81001 URINALYSIS AUTO W/SCOPE: CPT

## 2021-05-04 NOTE — TELEPHONE ENCOUNTER
Spoke to pt and advised her UC results collected on 5/3 are not back yet  She was told Dr Razia Christie would be forwarded the results when final       Pt has had 3 UTI's since 12/14/20 12/14/20 Gram Negative Shine  4/5/21 Non lactase fermenting gram Negative road  4/18/21 E  Coli    Her UA from 5/3 is indicating probable infection which would make 4 UTI's  Pt last seen by Dr Razia Christie on 8/25/20 for chronic cystitis and possible IC  She was taking post coital trimethoprim      Will contact pt after Dr Razia Christie reviews UC results when final

## 2021-05-04 NOTE — TELEPHONE ENCOUNTER
Pt called asking to speak to nurse or dr Nora Rainey himself about urine analysis results  Pt is requesting call back today if possible

## 2021-05-05 ENCOUNTER — TELEPHONE (OUTPATIENT)
Dept: UROLOGY | Facility: AMBULATORY SURGERY CENTER | Age: 66
End: 2021-05-05

## 2021-05-05 DIAGNOSIS — N39.0 URINARY TRACT INFECTION WITHOUT HEMATURIA, SITE UNSPECIFIED: Primary | ICD-10-CM

## 2021-05-05 LAB — BACTERIA UR CULT: ABNORMAL

## 2021-05-05 RX ORDER — CEFUROXIME AXETIL 500 MG/1
500 TABLET ORAL EVERY 12 HOURS SCHEDULED
Qty: 14 TABLET | Refills: 0 | Status: SHIPPED | OUTPATIENT
Start: 2021-05-05 | End: 2021-05-12

## 2021-05-05 NOTE — TELEPHONE ENCOUNTER
Please review prior message from Dr Heather Flores who place patient on cefuroxime 05/05/2021 at 1:57 p m  today

## 2021-05-05 NOTE — TELEPHONE ENCOUNTER
Patient called in inquiring urine culture results  Patient asked if she can be called today just incase she needs medication, she is going out of town tomorrow    Patient can be reached at [phone]

## 2021-05-05 NOTE — TELEPHONE ENCOUNTER
See my message- I started her on cefuroxime and I have already called this in- I also sent a message to have her see me in the near future

## 2021-05-05 NOTE — TELEPHONE ENCOUNTER
Spoke to pt and advised antibiotic was called in to pharmacy for UTI    Pt is scheduled for OV with Dr Forest Smyth on 6/25/21 to discuss recurring UTI's

## 2021-05-05 NOTE — TELEPHONE ENCOUNTER
----- Message from Ross Robb sent at 5/5/2021  2:14 PM EDT -----  Regarding: Test Results Question  Contact: [phone]  My recent urine culture shoes an e-coli infection  Please call me as soon as possible to discuss treatment  I have been battling this infection for nearly a month! Also, Dr Shoshana Brandt had said he wanted to see me if this culture was positive  Please advise      Thank you,  Ross Robb  [phone]

## 2021-05-05 NOTE — TELEPHONE ENCOUNTER
Patient is calling back to say she messaged thru my chart regarding medication  She wants to know if a message can be sent thru my chart stating if she needs to be seen in office  She stated he seen her results  She is aware of medication that was sent to [facility]  She will  today and start taking it  She was very appreciative

## 2021-06-10 ENCOUNTER — TRANSCRIBE ORDERS (OUTPATIENT)
Dept: ADMINISTRATIVE | Facility: HOSPITAL | Age: 66
End: 2021-06-10

## 2021-06-10 DIAGNOSIS — Z12.31 ENCOUNTER FOR SCREENING MAMMOGRAM FOR MALIGNANT NEOPLASM OF BREAST: Primary | ICD-10-CM

## 2021-06-25 ENCOUNTER — OFFICE VISIT (OUTPATIENT)
Dept: UROLOGY | Facility: MEDICAL CENTER | Age: 66
End: 2021-06-25
Payer: COMMERCIAL

## 2021-06-25 VITALS
SYSTOLIC BLOOD PRESSURE: 140 MMHG | HEIGHT: 63 IN | HEART RATE: 72 BPM | DIASTOLIC BLOOD PRESSURE: 80 MMHG | WEIGHT: 121 LBS | BODY MASS INDEX: 21.44 KG/M2

## 2021-06-25 DIAGNOSIS — N30.20 CHRONIC CYSTITIS: ICD-10-CM

## 2021-06-25 DIAGNOSIS — Z80.52 FAMILY HISTORY OF BLADDER CANCER: ICD-10-CM

## 2021-06-25 DIAGNOSIS — N39.0 URINARY TRACT INFECTION WITHOUT HEMATURIA, SITE UNSPECIFIED: Primary | ICD-10-CM

## 2021-06-25 LAB
SL AMB  POCT GLUCOSE, UA: NORMAL
SL AMB LEUKOCYTE ESTERASE,UA: NORMAL
SL AMB POCT BILIRUBIN,UA: NORMAL
SL AMB POCT BLOOD,UA: NORMAL
SL AMB POCT CLARITY,UA: CLEAR
SL AMB POCT COLOR,UA: YELLOW
SL AMB POCT KETONES,UA: NORMAL
SL AMB POCT NITRITE,UA: NORMAL
SL AMB POCT PH,UA: 5
SL AMB POCT SPECIFIC GRAVITY,UA: >=1.005
SL AMB POCT URINE PROTEIN: NORMAL
SL AMB POCT UROBILINOGEN: NORMAL

## 2021-06-25 PROCEDURE — 81003 URINALYSIS AUTO W/O SCOPE: CPT | Performed by: UROLOGY

## 2021-06-25 PROCEDURE — 99213 OFFICE O/P EST LOW 20 MIN: CPT | Performed by: UROLOGY

## 2021-06-25 RX ORDER — MAGNESIUM OXIDE 400 MG/1
400 TABLET ORAL DAILY
COMMUNITY
Start: 2020-12-09 | End: 2021-12-09

## 2021-06-25 NOTE — PROGRESS NOTES
[address] [facility]  [facility]  [address] [place], [address] [facility]  [phone]  www  [facility]  org      NAME: Myrna Karimi  AGE: 63 y o  SEX: female  : 1955   MRN: [id number]    DATE: 2021  TIME: 1:09 PM    Assessment and Plan:    Recurrent UTI/chronic cystitis, last episodes no longer postcoital but may been triggered by loose bowel movements/infection  No longer needs postcoital prophylaxis  Treat infections as they arise, and given her mother's history of bladder cancer and the fact that she has not been scoped for many years, we will do cystoscopy in the next several months  Chief Complaint     Chief Complaint   Patient presents with    Follow-up    Difficulty Urinating       History of Present Illness   Follow-up interstitial cystitis, which is triggered by her IBS attacks  She has had recent urinary tract infections treated with Augmentin and cefuroxime  In the past these have been postcoital   These have been prevented with trimethoprim 1 pill after intercourse  Last UTI 5/3/2021 showed greater than 100,000 colonies of E coli resistant to trimethoprim sulfamethoxazole, susceptible to Macrobid, susceptible to cefuroxime with which she was treated, resistant to ampicillin  The last cystoscopy was   These most recent infections were not postcoital   The symptoms developed soon after eating something and then having loose stools  She is no longer on the Bentyl for IBS  She has not needed it  Her last upper tract imaging was a renal ultrasound 2018 which was completely normal   Her symptoms of UTI were urgency and frequency perhaps dysuria  No fever  General malaise  She has now fully recovered  Her mother has been treated for bladder cancer since I last saw her    The following portions of the patient's history were reviewed and updated as appropriate: allergies, current medications, past family history, past medical history, past social history, past surgical history and problem list   Past Medical History:   Diagnosis Date    Frequency of urination     Hypertension     OAB (overactive bladder)     Urethritis     Urgency of urination     Urinary retention     UTI (urinary tract infection)      Past Surgical History:   Procedure Laterality Date    CYSTOSCOPY  2010    NEUROMA EXCISION      WISDOM TOOTH EXTRACTION       shoulder  Review of Systems   Review of Systems   Constitutional: Negative for fever  Respiratory: Negative for shortness of breath  Cardiovascular: Negative for chest pain  Genitourinary: Negative  Active Problem List   There is no problem list on file for this patient  Objective   Ht 5' 3" (1 6 m)   BMI 21 93 kg/m²     Physical Exam  Constitutional:       Appearance: Normal appearance  HENT:      Head: Normocephalic and atraumatic  Eyes:      Extraocular Movements: Extraocular movements intact  Pulmonary:      Effort: Pulmonary effort is normal    Musculoskeletal:         General: Normal range of motion  Cervical back: Normal range of motion  Skin:     Coloration: Skin is not jaundiced or pale  Neurological:      General: No focal deficit present  Mental Status: She is alert and oriented to person, place, and time  Mental status is at baseline  Psychiatric:         Mood and Affect: Mood normal          Behavior: Behavior normal          Thought Content:  Thought content normal          Judgment: Judgment normal              Current Medications     Current Outpatient Medications:     ACIDOPHILUS LACTOBACILLUS PO, Take 1 tablet by mouth, Disp: , Rfl:     dicyclomine (BENTYL) 10 mg capsule, Take 1 capsule (10 mg total) by mouth every 8 (eight) hours as needed (irritable bowel), Disp: 30 capsule, Rfl: 5    fluticasone (FLONASE) 50 mcg/act nasal spray, 2 sprays into each nostril as needed , Disp: , Rfl:     Omega-3 Fatty Acids (FISH OIL PO), Take 1 g by mouth, Disp: , Rfl:     omeprazole (PriLOSEC) 10 mg delayed release capsule, Take 10 mg by mouth as needed , Disp: , Rfl:     phenazopyridine (PYRIDIUM) 100 mg tablet, Take 1 tablet (100 mg total) by mouth 3 (three) times a day as needed for bladder spasms, Disp: 20 tablet, Rfl: 1    TOPROL XL 25 MG 24 hr tablet, TAKE 0 5 TABLETS (12 5 MG TOTAL) BY MOUTH DAILY  , Disp: , Rfl: 3        Jenny Yeager MD

## 2021-07-08 ENCOUNTER — HOSPITAL ENCOUNTER (OUTPATIENT)
Dept: MAMMOGRAPHY | Facility: MEDICAL CENTER | Age: 66
Discharge: HOME | End: 2021-07-08
Payer: COMMERCIAL

## 2021-07-08 VITALS — HEIGHT: 63 IN | WEIGHT: 121 LBS | BODY MASS INDEX: 21.44 KG/M2

## 2021-07-08 DIAGNOSIS — Z12.31 ENCOUNTER FOR SCREENING MAMMOGRAM FOR MALIGNANT NEOPLASM OF BREAST: ICD-10-CM

## 2021-07-08 PROCEDURE — 77063 BREAST TOMOSYNTHESIS BI: CPT

## 2021-07-08 PROCEDURE — 77067 SCR MAMMO BI INCL CAD: CPT

## 2021-09-01 ENCOUNTER — TELEPHONE (OUTPATIENT)
Dept: UROLOGY | Facility: MEDICAL CENTER | Age: 66
End: 2021-09-01

## 2021-09-01 NOTE — TELEPHONE ENCOUNTER
Patient of Dr Madie Mohs in [facility]    Patient called stating her cysto with Madie Mohs needs to be rescheduled  She stated she would see another provider in [facility] for cysto before the end of the year

## 2021-11-02 ENCOUNTER — TELEPHONE (OUTPATIENT)
Dept: UROLOGY | Facility: MEDICAL CENTER | Age: 66
End: 2021-11-02

## 2021-11-16 NOTE — TELEPHONE ENCOUNTER
Pt cancelled 11/17 cysto with Dr Beauchamp due to family emergency please review as pt needs to be rescheduled

## 2022-02-14 ENCOUNTER — PROCEDURE VISIT (OUTPATIENT)
Dept: UROLOGY | Facility: MEDICAL CENTER | Age: 67
End: 2022-02-14
Payer: COMMERCIAL

## 2022-02-14 VITALS
HEIGHT: 63 IN | BODY MASS INDEX: 22.68 KG/M2 | DIASTOLIC BLOOD PRESSURE: 80 MMHG | SYSTOLIC BLOOD PRESSURE: 124 MMHG | WEIGHT: 128 LBS

## 2022-02-14 DIAGNOSIS — N30.20 CHRONIC CYSTITIS: Primary | ICD-10-CM

## 2022-02-14 LAB
SL AMB  POCT GLUCOSE, UA: NORMAL
SL AMB LEUKOCYTE ESTERASE,UA: NORMAL
SL AMB POCT BILIRUBIN,UA: NORMAL
SL AMB POCT BLOOD,UA: NORMAL
SL AMB POCT CLARITY,UA: CLEAR
SL AMB POCT COLOR,UA: YELLOW
SL AMB POCT KETONES,UA: NORMAL
SL AMB POCT NITRITE,UA: NORMAL
SL AMB POCT PH,UA: 6.5
SL AMB POCT SPECIFIC GRAVITY,UA: 1
SL AMB POCT URINE PROTEIN: NORMAL
SL AMB POCT UROBILINOGEN: 0.2

## 2022-02-14 PROCEDURE — 52000 CYSTOURETHROSCOPY: CPT | Performed by: UROLOGY

## 2022-02-14 PROCEDURE — 81003 URINALYSIS AUTO W/O SCOPE: CPT | Performed by: UROLOGY

## 2022-02-14 NOTE — PATIENT INSTRUCTIONS

## 2022-02-14 NOTE — PROGRESS NOTES
Cystoscopy     Date/Time 2/14/2022 10:47 AM     Performed by  Deidra Brooks MD     Authorized by Deidra Brooks MD      Universal Protocol:  Consent: Verbal consent obtained  Written consent obtained  Risks and benefits: risks, benefits and alternatives were discussed  Consent given by: patient  Patient understanding: patient states understanding of the procedure being performed  Patient identity confirmed: verbally with patient        Procedure Details:  Procedure type: cystoscopy    Patient tolerance: Patient tolerated the procedure well with no immediate complications    Additional Procedure Details: Cystoscopy Procedure Note        Pre-operative Diagnosis: Recurrent urinary tract infections and a family history of bladder cancer    Post-operative Diagnosis:  No evidence of bladder tumor or bladder lesion  Procedure Details   The risks, benefits, complications, treatment options, and expected outcomes were discussed with the patient  The patient concurred with the proposed plan, giving informed consent  Cystoscopy was performed today under local anesthesia, using sterile technique  The patient was placed in the lithotomy  position, prepped and draped in the usual sterile fashion  A 15 Kazakh flexible cystoscope  was used to inspect both the urethra and bladder  Findings:  Normal bladder and orifices  Urethra is normal     Specimens:  Urinalysis today is negative for blood and leukocyte      Discussion:  The patient notes she is voiding well and she has not had any recent infection  She is happy with her voiding pattern  There is no gross hematuria or incontinence  Renal ultrasound was normal approximately 3 years ago  Cystoscopy is normal today

## 2022-02-14 NOTE — LETTER
February 14, 2022     Carin Samantha, [address]  [address]  [phone]    Patient: Ramirez Dupree   YOB: 1955   Date of Visit: 2/14/2022       Dear Dr Grant Rather: Thank you for referring Ramirez Dupree to me for evaluation  Below are my notes for this consultation  If you have questions, please do not hesitate to call me  I look forward to following your patient along with you  Sincerely,        Ariana Campbell MD        CC: No Recipients  Ariana Campbell MD  2/14/2022 10:50 AM  Sign when Signing Visit     Cystoscopy     Date/Time 2/14/2022 10:47 AM     Performed by  Ariana Campbell MD     Authorized by Ariana Campbell MD      Universal Protocol:  Consent: Verbal consent obtained  Written consent obtained  Risks and benefits: risks, benefits and alternatives were discussed  Consent given by: patient  Patient understanding: patient states understanding of the procedure being performed  Patient identity confirmed: verbally with patient        Procedure Details:  Procedure type: cystoscopy    Patient tolerance: Patient tolerated the procedure well with no immediate complications    Additional Procedure Details: Cystoscopy Procedure Note        Pre-operative Diagnosis: Recurrent urinary tract infections and a family history of bladder cancer    Post-operative Diagnosis:  No evidence of bladder tumor or bladder lesion  Procedure Details   The risks, benefits, complications, treatment options, and expected outcomes were discussed with the patient  The patient concurred with the proposed plan, giving informed consent  Cystoscopy was performed today under local anesthesia, using sterile technique  The patient was placed in the lithotomy  position, prepped and draped in the usual sterile fashion  A 15 [place] Leonean flexible cystoscope  was used to inspect both the urethra and bladder  Findings:  Normal bladder and orifices    Urethra is normal     Specimens: Urinalysis today is negative for blood and leukocyte      Discussion:  The patient notes she is voiding well and she has not had any recent infection  She is happy with her voiding pattern  There is no gross hematuria or incontinence  Renal ultrasound was normal approximately 3 years ago  Cystoscopy is normal today

## 2022-07-11 ENCOUNTER — HOSPITAL ENCOUNTER (OUTPATIENT)
Dept: MAMMOGRAPHY | Facility: MEDICAL CENTER | Age: 67
Discharge: HOME | End: 2022-07-11
Payer: COMMERCIAL

## 2022-07-11 VITALS — HEIGHT: 63 IN | WEIGHT: 128 LBS | BODY MASS INDEX: 22.68 KG/M2

## 2022-07-11 DIAGNOSIS — Z12.31 ENCOUNTER FOR SCREENING MAMMOGRAM FOR MALIGNANT NEOPLASM OF BREAST: ICD-10-CM

## 2022-07-11 PROCEDURE — 77067 SCR MAMMO BI INCL CAD: CPT

## 2022-07-11 PROCEDURE — 77063 BREAST TOMOSYNTHESIS BI: CPT

## 2022-11-11 NOTE — TELEPHONE ENCOUNTER
Patient called in stating she is on her last day of antibiotic and she is still having symptoms with burning and a lot itching and urine smell  Patient stated she has been having this issue for awhile and seems nothing is helping and she has questions regarding the bacteria which was found in the testing  Patient also asked if she can have a underlying issue   Patient can be reached at [phone] 11-Nov-2022 10:38

## 2023-03-21 ENCOUNTER — OFFICE VISIT (OUTPATIENT)
Dept: UROLOGY | Facility: MEDICAL CENTER | Age: 68
End: 2023-03-21

## 2023-03-21 VITALS
SYSTOLIC BLOOD PRESSURE: 188 MMHG | HEIGHT: 63 IN | WEIGHT: 133 LBS | DIASTOLIC BLOOD PRESSURE: 82 MMHG | BODY MASS INDEX: 23.57 KG/M2 | OXYGEN SATURATION: 99 % | HEART RATE: 77 BPM

## 2023-03-21 DIAGNOSIS — N30.20 CHRONIC CYSTITIS: Primary | ICD-10-CM

## 2023-03-21 DIAGNOSIS — N39.0 RECURRENT URINARY TRACT INFECTION: ICD-10-CM

## 2023-03-21 DIAGNOSIS — N39.0 URINARY TRACT INFECTION WITHOUT HEMATURIA, SITE UNSPECIFIED: ICD-10-CM

## 2023-03-21 LAB
BACTERIA UR QL AUTO: NORMAL /HPF
BILIRUB UR QL STRIP: NEGATIVE
CLARITY UR: CLEAR
COLOR UR: NORMAL
GLUCOSE UR STRIP-MCNC: NEGATIVE MG/DL
HGB UR QL STRIP.AUTO: NEGATIVE
KETONES UR STRIP-MCNC: NEGATIVE MG/DL
LEUKOCYTE ESTERASE UR QL STRIP: NEGATIVE
NITRITE UR QL STRIP: NEGATIVE
NON-SQ EPI CELLS URNS QL MICRO: NORMAL /HPF
PH UR STRIP.AUTO: 5.5 [PH]
PROT UR STRIP-MCNC: NEGATIVE MG/DL
RBC #/AREA URNS AUTO: NORMAL /HPF
SL AMB  POCT GLUCOSE, UA: ABNORMAL
SL AMB LEUKOCYTE ESTERASE,UA: ABNORMAL
SL AMB POCT BILIRUBIN,UA: ABNORMAL
SL AMB POCT BLOOD,UA: ABNORMAL
SL AMB POCT CLARITY,UA: CLEAR
SL AMB POCT COLOR,UA: YELLOW
SL AMB POCT KETONES,UA: ABNORMAL
SL AMB POCT NITRITE,UA: ABNORMAL
SL AMB POCT PH,UA: 5.5
SL AMB POCT SPECIFIC GRAVITY,UA: 1.02
SL AMB POCT URINE PROTEIN: ABNORMAL
SL AMB POCT UROBILINOGEN: 0.2
SP GR UR STRIP.AUTO: 1.02 (ref 1–1.03)
UROBILINOGEN UR STRIP-ACNC: <2 MG/DL
WBC #/AREA URNS AUTO: NORMAL /HPF

## 2023-03-21 NOTE — PROGRESS NOTES
Assessment/Plan:    Chronic cystitis  She is doing well and clinically asymptomatic  Cystoscopy last year was unremarkable  Urinalysis does reveal trace blood on dipstick and we will send urine for a formal microscopic to ensure this is not clinically significant  She will return in 1 year if all remains well  Recurrent urinary tract infection  No recent intercurrent infections  She is doing well  Diagnoses and all orders for this visit:    Chronic cystitis  -     Urinalysis with microscopic; Future    Recurrent urinary tract infection    Urinary tract infection without hematuria, site unspecified  -     POCT urine dip auto non-scope  -     Urinalysis with microscopic          Subjective:      Patient ID: Cameron Gupta is a 74 y o  female  Chief complaint: Chronic cystitis,  recurrent urinary tract infection    HPI: 63-year-old female followed for the above complaints  She reports that she is done well over the last year  She has not had any intercurrent infections  She voids adequately and feels she empties her bladder  She has no urgency or incontinence  She does stay hydrated and tends to void a little more frequently  She does not get up at night to urinate  There has been no gross hematuria, dysuria or symptoms of infection  She has no flank or back pain  She is pleased with her voiding pattern  The following portions of the patient's history were reviewed and updated as appropriate: allergies, current medications, past family history, past medical history, past social history, past surgical history and problem list     Review of Systems   Constitutional: Negative  Negative for activity change, appetite change, fatigue and fever  HENT: Negative  Eyes: Negative  Respiratory: Negative  Cardiovascular: Negative  Gastrointestinal: Negative for blood in stool, constipation, diarrhea and vomiting  Recent neg colonoscopy   Endocrine: Negative      Genitourinary: See HPI   Musculoskeletal: Negative  Skin: Negative  Allergic/Immunologic: Negative  Neurological: Negative  Hematological: Negative  Psychiatric/Behavioral: Negative  Objective:      BP (!) 188/82   Pulse 77   Ht 5' 3" (1 6 m)   Wt 60 3 kg (133 lb)   SpO2 99%   BMI 23 56 kg/m²          Physical Exam  Constitutional:       General: She is not in acute distress  Appearance: Normal appearance  She is well-developed and normal weight  She is not ill-appearing, toxic-appearing or diaphoretic  HENT:      Head: Normocephalic and atraumatic  Eyes:      General: No scleral icterus  Conjunctiva/sclera: Conjunctivae normal    Cardiovascular:      Rate and Rhythm: Normal rate  Pulmonary:      Effort: Pulmonary effort is normal    Abdominal:      General: There is no distension  Palpations: Abdomen is soft  There is no mass  Tenderness: There is no abdominal tenderness  There is no right CVA tenderness, left CVA tenderness, guarding or rebound  Hernia: No hernia is present  Musculoskeletal:      Cervical back: Neck supple  Skin:     General: Skin is warm and dry  Neurological:      General: No focal deficit present  Mental Status: She is alert and oriented to person, place, and time  Psychiatric:         Mood and Affect: Mood normal          Behavior: Behavior normal          Thought Content:  Thought content normal          Judgment: Judgment normal

## 2023-03-21 NOTE — ASSESSMENT & PLAN NOTE
She is doing well and clinically asymptomatic  Cystoscopy last year was unremarkable  Urinalysis does reveal trace blood on dipstick and we will send urine for a formal microscopic to ensure this is not clinically significant  She will return in 1 year if all remains well

## 2023-03-31 ENCOUNTER — PATIENT MESSAGE (OUTPATIENT)
Dept: UROLOGY | Facility: MEDICAL CENTER | Age: 68
End: 2023-03-31

## 2023-03-31 ENCOUNTER — TELEPHONE (OUTPATIENT)
Dept: OTHER | Facility: OTHER | Age: 68
End: 2023-03-31

## 2023-03-31 DIAGNOSIS — N30.90 CYSTITIS: Primary | ICD-10-CM

## 2023-03-31 NOTE — TELEPHONE ENCOUNTER
Spoke to pt and advised:    Pierre Peterson MD  to Me      2:04 PM   Urinalysis does not show clinically significant microscopic blood   He can keep her follow-up next year and we will recheck her urinalysis prior to that visit  UA placed in chart

## 2023-07-12 ENCOUNTER — HOSPITAL ENCOUNTER (OUTPATIENT)
Dept: MAMMOGRAPHY | Facility: MEDICAL CENTER | Age: 68
Discharge: HOME | End: 2023-07-12
Payer: MEDICARE

## 2023-07-12 VITALS — HEIGHT: 63 IN | WEIGHT: 133 LBS | BODY MASS INDEX: 23.57 KG/M2

## 2023-07-12 DIAGNOSIS — Z12.31 ENCOUNTER FOR SCREENING MAMMOGRAM FOR MALIGNANT NEOPLASM OF BREAST: ICD-10-CM

## 2023-07-12 PROCEDURE — 77067 SCR MAMMO BI INCL CAD: CPT

## 2023-07-12 PROCEDURE — 77063 BREAST TOMOSYNTHESIS BI: CPT

## 2023-10-25 ENCOUNTER — OFFICE VISIT (OUTPATIENT)
Dept: URGENT CARE | Facility: MEDICAL CENTER | Age: 68
End: 2023-10-25
Payer: MEDICARE

## 2023-10-25 VITALS
TEMPERATURE: 96.5 F | OXYGEN SATURATION: 99 % | RESPIRATION RATE: 16 BRPM | BODY MASS INDEX: 23.04 KG/M2 | SYSTOLIC BLOOD PRESSURE: 208 MMHG | HEIGHT: 63 IN | DIASTOLIC BLOOD PRESSURE: 84 MMHG | WEIGHT: 130 LBS | HEART RATE: 96 BPM

## 2023-10-25 DIAGNOSIS — S80.262A INSECT BITE OF LEFT KNEE, INITIAL ENCOUNTER: Primary | ICD-10-CM
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
## 2023-10-25 PROCEDURE — G0463 HOSPITAL OUTPT CLINIC VISIT: HCPCS

## 2023-10-25 PROCEDURE — 99213 OFFICE O/P EST LOW 20 MIN: CPT

## 2023-10-25 RX ORDER — HYDROXYZINE 50 MG/1
50 TABLET, FILM COATED ORAL 3 TIMES DAILY PRN
Qty: 30 TABLET | Refills: 0 | Status: SHIPPED | OUTPATIENT
Start: 2023-10-25

## 2023-10-25 RX ORDER — TRIAMCINOLONE ACETONIDE 0.25 MG/G
CREAM TOPICAL 2 TIMES DAILY
Qty: 15 G | Refills: 0 | Status: SHIPPED | OUTPATIENT
Start: 2023-10-25

## 2023-10-26 NOTE — PROGRESS NOTES
[facility]        NAME: Lenora Kawasaki is a 79 y.o. female  : 1955    MRN: [id number]  DATE: 2023  TIME: 9:41 PM    Assessment and Plan   Insect bite of left knee, initial encounter Xavi Byrd. XXXA]  1. Insect bite of left knee, initial encounter  hydrOXYzine HCL (ATARAX) 50 mg tablet    triamcinolone (KENALOG) 0.025 % cream        Localized reaction to insect bite/sting to inner left knee. Prescribed hydroxyzine and triamcinolone cream.    Patient Instructions     Prescribed course of topical steroids, oral antihistamine medication - take as directed. Apply cool compresses to the area. Utilize Tylenol or ibuprofen as needed for pain. Follow up with PCP in 3-5 days. Proceed to  ER if symptoms worsen. Chief Complaint     Chief Complaint   Patient presents with    Insect Bite     Pt states on Monday an insect stung her in her thigh. Pt stated theres burning sensation, redness, and pain. History of Present Illness       Patient states 2 days ago she was stung behind her left knee by an insect when she was in [place]. She is unsure what type of insect it was. She did ice and elevate it. She applied calamine lotion, baking soda paste - these helped to soothe it. She states tonight she noticed the pain was slightly worse, at the time her pain was a 5/10. She states now it is not bothering her as much. Review of Systems   Review of Systems   Constitutional:  Negative for chills and fever. Musculoskeletal:  Negative for myalgias. Skin:  Positive for color change and wound (insect bite).          Current Medications       Current Outpatient Medications:     hydrOXYzine HCL (ATARAX) 50 mg tablet, Take 1 tablet (50 mg total) by mouth 3 (three) times a day as needed for itching, Disp: 30 tablet, Rfl: 0    triamcinolone (KENALOG) 0.025 % cream, Apply topically 2 (two) times a day, Disp: 15 g, Rfl: 0    ACIDOPHILUS LACTOBACILLUS PO, Take 1 tablet by mouth, Disp: , Rfl: Cholecalciferol 125 MCG (5000 UT) capsule, Take 5,000 Units by mouth daily, Disp: , Rfl:     dicyclomine (BENTYL) 10 mg capsule, Take 1 capsule (10 mg total) by mouth every 8 (eight) hours as needed (irritable bowel) (Patient not taking: Reported on 3/21/2023), Disp: 30 capsule, Rfl: 5    fluticasone (FLONASE) 50 mcg/act nasal spray, 2 sprays into each nostril as needed , Disp: , Rfl:     Omega-3 Fatty Acids (FISH OIL PO), Take 1 g by mouth, Disp: , Rfl:     omeprazole (PriLOSEC) 10 mg delayed release capsule, Take 10 mg by mouth as needed , Disp: , Rfl:     phenazopyridine (PYRIDIUM) 100 mg tablet, Take 1 tablet (100 mg total) by mouth 3 (three) times a day as needed for bladder spasms, Disp: 20 tablet, Rfl: 1    TOPROL XL 25 MG 24 hr tablet, 37.5 mg , Disp: , Rfl: 3    Current Allergies     Allergies as of 10/25/2023 - Reviewed 10/25/2023   Allergen Reaction Noted    Carvedilol Other (See Comments) 03/13/2017    Erythromycin Anaphylaxis, Hives, Rash, and Headache 03/31/2015    Macrobid [nitrofurantoin] Anaphylaxis, Hives, Rash, and Headache 03/31/2015    Sulfa antibiotics Anaphylaxis, Hives, and Rash 03/31/2015            The following portions of the patient's history were reviewed and updated as appropriate: allergies, current medications, past family history, past medical history, past social history, past surgical history and problem list.     Past Medical History:   Diagnosis Date    Frequency of urination     Hypertension     OAB (overactive bladder)     Urethritis     Urgency of urination     Urinary retention     UTI (urinary tract infection)        Past Surgical History:   Procedure Laterality Date    CYSTOSCOPY  2010    NEUROMA EXCISION      WISDOM TOOTH EXTRACTION         Family History   Problem Relation Age of Onset    Diabetes Mother     Cancer Mother         bladder cancer    Hypertension Father     Lung cancer Father 58    No Known Problems Sister     Nephrolithiasis Daughter     No Known Problems Daughter     No Known Problems Daughter     Rectal cancer Maternal Grandmother 72    No Known Problems Maternal Grandfather     No Known Problems Paternal Grandmother     No Known Problems Paternal Grandfather     No Known Problems Maternal Aunt     No Known Problems Maternal Aunt     No Known Problems Paternal Aunt     No Known Problems Paternal Aunt     No Known Problems Paternal Aunt     No Known Problems Paternal Aunt     Breast cancer Neg Hx          Medications have been verified. Objective   BP (!) 208/84   Pulse 96   Temp (!) 96.5 °F (35.8 °C)   Resp 16   Ht 5' 3" (1.6 m)   Wt 59 kg (130 lb)   SpO2 99%   BMI 23.03 kg/m²        Physical Exam     Physical Exam  Vitals and nursing note reviewed. Constitutional:       General: She is not in acute distress. Appearance: Normal appearance. She is not ill-appearing. Cardiovascular:      Rate and Rhythm: Normal rate and regular rhythm. Pulses: Normal pulses. Heart sounds: Normal heart sounds. Pulmonary:      Effort: Pulmonary effort is normal.      Breath sounds: Normal breath sounds. Skin:     Comments: Localized area of erythema, swelling - posterior left knee. Minimal TTP. No red streaking. No drainage. No stinger. Neurological:      Mental Status: She is alert.

## 2024-02-21 PROBLEM — N39.0 RECURRENT URINARY TRACT INFECTION: Status: RESOLVED | Noted: 2023-03-21 | Resolved: 2024-02-21

## 2024-07-29 NOTE — PATIENT INSTRUCTIONS
Prescribed course of topical steroids, oral antihistamine medication - take as directed. Apply cool compresses to the area. Utilize Tylenol or ibuprofen as needed for pain. Follow up with PCP in 3-5 days. Proceed to  ER if symptoms worsen. Insect Bite or Sting   AMBULATORY CARE:   Most insect bites and stings  are not dangerous and go away without treatment. Common examples of insects that bite or sting are bees, ticks, mosquitoes, spiders, and ants. Insect bites or stings can lead to diseases such as malaria, West Nile virus, Lyme disease, or [place] Spotted Fever. Common signs and symptoms:   Mild symptoms  include a red bump, pain, swelling, and itching. Anaphylaxis symptoms  include throat tightness, trouble breathing, tingling, dizziness, and wheezing. Anaphylaxis is a sudden, life-threatening reaction that needs immediate treatment. Call your local emergency number (911 in the [address]) for signs or symptoms of anaphylaxis,  such as trouble breathing, swelling in your mouth or throat, or wheezing. You may also have itching, a rash, hives, or feel like you are going to faint. Seek care immediately if:   You are stung on your tongue or in your throat. A white area forms around the bite. You are sweating badly or have body pain. You think you were bitten or stung by a poisonous insect. Call your doctor if:   You have a fever. The area becomes red, warm, tender, and swollen beyond the area of the bite or sting. You have questions or concerns about your condition or care. Steps to take for signs or symptoms of anaphylaxis:   Immediately  give 1 shot of epinephrine only into the outer thigh muscle. Leave the shot in place  as directed. Your healthcare provider may recommend you leave it in place for up to 10 seconds before you remove it. This helps make sure all of the epinephrine is delivered.     Call 911 and go to the emergency department,  even if the shot improved symptoms. Do not drive yourself. Bring the used epinephrine shot with you. Treatment  depends on how severe your symptoms are and if you had anaphylaxis before. You may need any of the following:  Antihistamines  decrease mild symptoms such as itching and rash. Epinephrine  is medicine used to treat severe allergic reactions such as anaphylaxis. A tetanus shot  may be given. The shot is a vaccine that helps prevent a bacterial infection. Tetanus booster shots are usually given every 10 years. If an insect bites or stings you:   Remove the stinger. Scrape the stinger out with your fingernail, edge of a credit card, or a knife blade. Do not squeeze the wound. Gently wash the area with soap and water. Remove the tick. Ticks must be removed as soon as possible so you do not get diseases passed through tick bites. Ask your healthcare provider for more information on tick bites and how to remove ticks. Care for your bite or sting wound:   Elevate (raise) the area above the level of your heart, if possible. Prop the area on pillows to keep it raised comfortably. Elevate the area for 10 to 20 minutes each hour or as directed by your healthcare provider. Apply compresses. Soak a clean washcloth in cold water, wring it out, and put it on the bite or sting. Use the compress for 10 to 20 minutes each hour or as directed by your healthcare provider. After 24 to 48 hours, change to warm compresses. Apply a baking soda paste. Add water to baking soda to make a thick paste. Put the paste on the area for 5 minutes. Rinse gently to remove the paste. Safety precautions to take if you are at risk for anaphylaxis:   Keep 2 shots of epinephrine with you at all times. You may need a second shot, because epinephrine only works for about 20 minutes and symptoms may return. Your healthcare provider can show you and family members how to give the shot.  Check the expiration date every month and replace it before it expires. Create an action plan. Your healthcare provider can help you create a written plan that explains the allergy and an emergency plan to treat a reaction. The plan explains when to give a second epinephrine shot if symptoms return or do not improve after the first. Give copies of the action plan and emergency instructions to family members, work and school staff, and  providers. Show them how to give a shot of epinephrine. Carry medical alert identification. Wear medical alert jewelry or carry a card that says you have an insect allergy. Ask your healthcare provider where to get these items. Prevent an insect bite or sting:   Do not wear bright-colored or flower-print clothing when you plan to spend time outdoors. Do not use hairspray, perfumes, or aftershave. Do not leave food out. Empty any standing water. Wash containers with soap and water every 2 days. Put screens on all open windows and doors. Put insect repellent that contains DEET on skin that is showing when you go outside. Put insect repellent at the top of your boots, bottom of pant legs, and sleeve cuffs. Wear long sleeves, pants, and shoes. Use citronella candles outdoors to help keep mosquitoes away. Put a tick and flea collar on pets. Follow up with your doctor as directed:  Write down your questions so you remember to ask them during your visits. © Copyright [facility] 2023 Information is for End User's use only and may not be sold, redistributed or otherwise used for commercial purposes. The above information is an  only. It is not intended as medical advice for individual conditions or treatments. Talk to your doctor, nurse or pharmacist before following any medical regimen to see if it is safe and effective for you. 24 Attending Discharge Physical Examination:

## 2024-08-02 ENCOUNTER — TELEPHONE (OUTPATIENT)
Dept: UROLOGY | Facility: MEDICAL CENTER | Age: 69
End: 2024-08-02

## 2024-08-02 NOTE — TELEPHONE ENCOUNTER
Spoke to patient requesting she get her urinalysis done prior to 8/6 appt with Dr. Whitehead.  Patient is currently on vacation and said she will go Monday morning.

## 2024-08-05 NOTE — TELEPHONE ENCOUNTER
Patient went for UA this am but there was no active order in Epic. She did have a UA done last month ordered by PCP. Does she need to have another one? If so, she will need a new order and a phone call to let her know she needs to go back to the lab. If she doesn't need one we don't have to call her back.    CB: [phone]

## 2024-08-05 NOTE — TELEPHONE ENCOUNTER
Call placed to pt. Spoke with her. Informed her that she can provide sample in the office at tomorrow's appointment and this will be sufficient.    Pt also had urine testing done on 7- and results are in her chart.

## 2024-08-06 DIAGNOSIS — N30.20 CHRONIC CYSTITIS: Primary | ICD-10-CM

## 2024-08-19 ENCOUNTER — HOSPITAL ENCOUNTER (OUTPATIENT)
Dept: MAMMOGRAPHY | Facility: MEDICAL CENTER | Age: 69
Discharge: HOME | End: 2024-08-19
Payer: MEDICARE

## 2024-08-19 VITALS — BODY MASS INDEX: 23.04 KG/M2 | WEIGHT: 130 LBS | HEIGHT: 63 IN

## 2024-08-19 DIAGNOSIS — Z12.31 ENCOUNTER FOR SCREENING MAMMOGRAM FOR MALIGNANT NEOPLASM OF BREAST: ICD-10-CM

## 2024-08-19 PROCEDURE — 77067 SCR MAMMO BI INCL CAD: CPT

## 2024-08-19 PROCEDURE — 77063 BREAST TOMOSYNTHESIS BI: CPT

## 2024-11-21 ENCOUNTER — APPOINTMENT (OUTPATIENT)
Dept: LAB | Facility: MEDICAL CENTER | Age: 69
End: 2024-11-21
Payer: MEDICARE

## 2024-11-21 DIAGNOSIS — N30.20 CHRONIC CYSTITIS: ICD-10-CM

## 2024-11-21 LAB

## 2024-11-21 PROCEDURE — 81001 URINALYSIS AUTO W/SCOPE: CPT

## 2024-11-21 NOTE — PROGRESS NOTES
11/25/2024    No chief complaint on file.    Assessment and Plan    69 y.o. female managed by Dr. Whitehead             1. Chronic cystitis  Assessment & Plan:  Chronic cystitis  Cystoscopy 2022- unremarkable   Denies incontinence, dysuria or gross hematuria   UA- 11/21-negative for blood and infection   Last UTI on file was from 5/3/2021  Doing well with D-mannose   Thinking about adding vitamin C and cranberry  Dosages reviewed  Plan to continue with d-mannose   Follow-up in 1 year      We discussed ways to help to prevent urinary tract infections.     Good fluid hydration, control of bowel habits with avoidance of constipation and diarrhea, reviewed appropriate bladder and sexual hygiene, 100% cranberry juice or cranberry supplement tabs, pro- or pre- biotics, and D-mannose (a supplement available OTC which reduces bacterial binding to the bladder wall) have all been shown to improve recurrent urinary infection. C    UTI prevention:  Vit C 1 g daily  D-mannose 2 g daily-is a type of sugar that can help prevent UTIs by preventing bacteria from adhering to the urinary tract lining.  Is often uses a dietary supplement.,  Used as a preventative measure for UTIs particular in patients who have recurrent infections or has a complementary approach alongside other treatments.  Cranberry 500 mg daily   Orders:  -     Urinalysis with microscopic; Future; Expected date: 11/10/2025          History of Present Illness  Tri Green is a 69 y.o. female here for evaluation of chronic cystitis.  Patient reports doing well.  She denies recent urinary tract infection for some time now.  She denies dysuria, frequency or urgency or gross hematuria.  She continues on d-mannose.She is no longer on the Bentyl for IBS.  She reports no flares for some time now.  She does have a family history of bladder cancer.     Previously was using antibiotics postcoital.  Thought to have been triggered by loose bowel movements.  Postcoital  "prophylaxis was then discontinued. 2021    Review of Systems   Constitutional:  Negative for chills and fever.   HENT:  Negative for ear pain and sore throat.    Eyes:  Negative for pain and visual disturbance.   Respiratory:  Negative for cough and shortness of breath.    Cardiovascular:  Negative for chest pain and palpitations.   Gastrointestinal:  Negative for abdominal pain and vomiting.   Genitourinary:  Negative for difficulty urinating, dysuria, flank pain, frequency, hematuria, pelvic pain and urgency.   Musculoskeletal:  Negative for arthralgias and back pain.   Skin:  Negative for color change and rash.   Neurological:  Negative for seizures and syncope.   All other systems reviewed and are negative.    Vitals  Vitals:    11/25/24 1051   BP: 144/90   BP Location: Left arm   Patient Position: Sitting   Cuff Size: Adult   Pulse: 76   SpO2: 99%   Weight: 59.8 kg (131 lb 12.8 oz)   Height: 5' 3\" (1.6 m)       Physical Exam  Constitutional:       Appearance: Normal appearance.   HENT:      Head: Normocephalic and atraumatic.   Pulmonary:      Effort: Pulmonary effort is normal.   Musculoskeletal:         General: Normal range of motion.      Cervical back: Normal range of motion.   Neurological:      General: No focal deficit present.      Mental Status: She is alert and oriented to person, place, and time. Mental status is at baseline.   Psychiatric:         Mood and Affect: Mood normal.         Behavior: Behavior normal.         Thought Content: Thought content normal.         Past History  Past Medical History:   Diagnosis Date    Frequency of urination     Hypertension     OAB (overactive bladder)     Urethritis     Urgency of urination     Urinary retention     UTI (urinary tract infection)      Social History     Socioeconomic History    Marital status: /Civil Union     Spouse name: None    Number of children: None    Years of education: None    Highest education level: None   Occupational History "    None   Tobacco Use    Smoking status: Former     Current packs/day: 0.00     Types: Cigarettes     Start date:      Quit date:      Years since quittin.9    Smokeless tobacco: Never   Vaping Use    Vaping status: Never Used   Substance and Sexual Activity    Alcohol use: Yes     Alcohol/week: 2.0 standard drinks of alcohol     Types: 2 Glasses of wine per week    Drug use: No    Sexual activity: None   Other Topics Concern    None   Social History Narrative    None     Social Drivers of Health     Financial Resource Strain: Low Risk  (2024)    Received from [facility]    Overall Financial Resource Strain (CARDIA)     Difficulty of Paying Living Expenses: Not hard at all   Food Insecurity: No Food Insecurity (2024)    Received from [facility]    Hunger Vital Sign     Worried About Running Out of Food in the Last Year: Never true     Ran Out of Food in the Last Year: Never true   Transportation Needs: No Transportation Needs (2024)    Received from [facility]    PRAPARE - Transportation     Lack of Transportation (Medical): No     Lack of Transportation (Non-Medical): No   Physical Activity: Not on file   Stress: Patient Declined (2024)    Received from [facility]    [employer] - Occupational Stress Questionnaire     Feeling of Stress : Patient declined   Social Connections: Feeling Socially Integrated (2024)    Received from [facility]    OASIS : Social Isolation     How often do you feel lonely or isolated from those around you?: Rarely   Intimate Partner Violence: Not At Risk (2024)    Received from [facility]    Humiliation, Afraid, Rape, and Kick questionnaire     Fear of Current or Ex-Partner: No     Emotionally Abused: No     Physically Abused: No     Sexually Abused: No   Housing Stability: Low Risk  (2024)    Received  "from [facility]    Housing Stability Vital Sign     Unable to Pay for Housing in the Last Year: No     Number of Times Moved in the Last Year: 0     Homeless in the Last Year: No     Social History     Tobacco Use   Smoking Status Former    Current packs/day: 0.00    Types: Cigarettes    Start date:     Quit date:     Years since quittin.9   Smokeless Tobacco Never     Family History   Problem Relation Age of Onset    Diabetes Mother     Cancer Mother         bladder cancer    Hypertension Father     Lung cancer Father 62    No Known Problems Sister     Nephrolithiasis Daughter     No Known Problems Daughter     No Known Problems Daughter     Rectal cancer Maternal Grandmother 65    No Known Problems Maternal Grandfather     No Known Problems Paternal Grandmother     No Known Problems Paternal Grandfather     No Known Problems Maternal Aunt     No Known Problems Maternal Aunt     No Known Problems Paternal Aunt     No Known Problems Paternal Aunt     No Known Problems Paternal Aunt     No Known Problems Paternal Aunt     Breast cancer Neg Hx        The following portions of the patient's history were reviewed and updated as appropriate: allergies, current medications, past medical history, past social history, past surgical history and problem list.    Results  No results found for this or any previous visit (from the past hour).]  No results found for: \"PSA\"  Lab Results   Component Value Date    CALCIUM 9.5 2024    K 4.6 2024    CO2 28 2024     2024    BUN 19 2024    CREATININE 0.97 2024     No results found for: \"WBC\", \"HGB\", \"HCT\", \"MCV\", \"PLT\"    "

## 2024-11-25 ENCOUNTER — OFFICE VISIT (OUTPATIENT)
Dept: UROLOGY | Facility: MEDICAL CENTER | Age: 69
End: 2024-11-25
Payer: MEDICARE

## 2024-11-25 VITALS
OXYGEN SATURATION: 99 % | HEIGHT: 63 IN | SYSTOLIC BLOOD PRESSURE: 144 MMHG | DIASTOLIC BLOOD PRESSURE: 90 MMHG | BODY MASS INDEX: 23.35 KG/M2 | HEART RATE: 76 BPM | WEIGHT: 131.8 LBS

## 2024-11-25 DIAGNOSIS — N30.20 CHRONIC CYSTITIS: Primary | ICD-10-CM

## 2024-11-25 PROCEDURE — 99213 OFFICE O/P EST LOW 20 MIN: CPT

## 2024-11-25 RX ORDER — LOSARTAN POTASSIUM 25 MG/1
25 TABLET ORAL DAILY
COMMUNITY
Start: 2024-07-23 | End: 2025-07-23

## 2024-11-25 NOTE — PATIENT INSTRUCTIONS
Vit C 1 g daily- pill or chewable can do   D-mannose 2 g daily-is a type of sugar that can help prevent UTIs by preventing bacteria from adhering to the urinary tract lining.  Is often uses a dietary supplement.,  Used as a preventative measure for UTIs particular in patients who have recurrent infections or has a complementary approach alongside other treatments.  Cranberry 500-1500 mg daily

## 2024-11-25 NOTE — ASSESSMENT & PLAN NOTE
Chronic cystitis  Cystoscopy 2022- unremarkable   Denies incontinence, dysuria or gross hematuria   UA- 11/21-negative for blood and infection   Last UTI on file was from 5/3/2021  Doing well with D-mannose   Thinking about adding vitamin C and cranberry  Dosages reviewed  Plan to continue with d-mannose   Follow-up in 1 year      We discussed ways to help to prevent urinary tract infections.     Good fluid hydration, control of bowel habits with avoidance of constipation and diarrhea, reviewed appropriate bladder and sexual hygiene, 100% cranberry juice or cranberry supplement tabs, pro- or pre- biotics, and D-mannose (a supplement available OTC which reduces bacterial binding to the bladder wall) have all been shown to improve recurrent urinary infection. C    UTI prevention:  Vit C 1 g daily  D-mannose 2 g daily-is a type of sugar that can help prevent UTIs by preventing bacteria from adhering to the urinary tract lining.  Is often uses a dietary supplement.,  Used as a preventative measure for UTIs particular in patients who have recurrent infections or has a complementary approach alongside other treatments.  Cranberry 500 mg daily